# Patient Record
Sex: FEMALE | Race: WHITE | NOT HISPANIC OR LATINO | URBAN - METROPOLITAN AREA
[De-identification: names, ages, dates, MRNs, and addresses within clinical notes are randomized per-mention and may not be internally consistent; named-entity substitution may affect disease eponyms.]

---

## 2018-12-28 ENCOUNTER — INPATIENT (INPATIENT)
Facility: HOSPITAL | Age: 48
LOS: 5 days | Discharge: HOME | DRG: 392 | End: 2019-01-03
Attending: INTERNAL MEDICINE | Admitting: INTERNAL MEDICINE
Payer: MEDICARE

## 2018-12-28 VITALS
DIASTOLIC BLOOD PRESSURE: 77 MMHG | WEIGHT: 149.03 LBS | HEIGHT: 64 IN | RESPIRATION RATE: 16 BRPM | HEART RATE: 104 BPM | TEMPERATURE: 98 F | OXYGEN SATURATION: 100 % | SYSTOLIC BLOOD PRESSURE: 115 MMHG

## 2018-12-28 LAB
ALBUMIN SERPL ELPH-MCNC: 3 G/DL — LOW (ref 3.5–5)
ALP SERPL-CCNC: 124 U/L — HIGH (ref 40–120)
ALT FLD-CCNC: 28 U/L DA — SIGNIFICANT CHANGE UP (ref 10–60)
AMYLASE P1 CFR SERPL: 50 U/L — SIGNIFICANT CHANGE UP (ref 25–115)
ANION GAP SERPL CALC-SCNC: 7 MMOL/L — SIGNIFICANT CHANGE UP (ref 5–17)
APPEARANCE UR: CLEAR — SIGNIFICANT CHANGE UP
AST SERPL-CCNC: 20 U/L — SIGNIFICANT CHANGE UP (ref 10–40)
BACTERIA # UR AUTO: ABNORMAL /HPF
BASOPHILS # BLD AUTO: 0 K/UL — SIGNIFICANT CHANGE UP (ref 0–0.2)
BASOPHILS NFR BLD AUTO: 0.4 % — SIGNIFICANT CHANGE UP (ref 0–2)
BILIRUB SERPL-MCNC: 0.4 MG/DL — SIGNIFICANT CHANGE UP (ref 0.2–1.2)
BILIRUB UR-MCNC: NEGATIVE — SIGNIFICANT CHANGE UP
BUN SERPL-MCNC: 14 MG/DL — SIGNIFICANT CHANGE UP (ref 7–18)
CALCIUM SERPL-MCNC: 8.8 MG/DL — SIGNIFICANT CHANGE UP (ref 8.4–10.5)
CHLORIDE SERPL-SCNC: 101 MMOL/L — SIGNIFICANT CHANGE UP (ref 96–108)
CO2 SERPL-SCNC: 29 MMOL/L — SIGNIFICANT CHANGE UP (ref 22–31)
COLOR SPEC: YELLOW — SIGNIFICANT CHANGE UP
CREAT SERPL-MCNC: 0.55 MG/DL — SIGNIFICANT CHANGE UP (ref 0.5–1.3)
DIFF PNL FLD: ABNORMAL
EOSINOPHIL # BLD AUTO: 0.6 K/UL — HIGH (ref 0–0.5)
EOSINOPHIL NFR BLD AUTO: 7.3 % — HIGH (ref 0–6)
EPI CELLS # UR: ABNORMAL /HPF
GLUCOSE SERPL-MCNC: 98 MG/DL — SIGNIFICANT CHANGE UP (ref 70–99)
GLUCOSE UR QL: NEGATIVE — SIGNIFICANT CHANGE UP
HCG UR QL: NEGATIVE — SIGNIFICANT CHANGE UP
HCT VFR BLD CALC: 39.7 % — SIGNIFICANT CHANGE UP (ref 34.5–45)
HGB BLD-MCNC: 12.4 G/DL — SIGNIFICANT CHANGE UP (ref 11.5–15.5)
KETONES UR-MCNC: NEGATIVE — SIGNIFICANT CHANGE UP
LEUKOCYTE ESTERASE UR-ACNC: NEGATIVE — SIGNIFICANT CHANGE UP
LIDOCAIN IGE QN: 63 U/L — LOW (ref 73–393)
LYMPHOCYTES # BLD AUTO: 1.1 K/UL — SIGNIFICANT CHANGE UP (ref 1–3.3)
LYMPHOCYTES # BLD AUTO: 14.7 % — SIGNIFICANT CHANGE UP (ref 13–44)
MAGNESIUM SERPL-MCNC: 1.9 MG/DL — SIGNIFICANT CHANGE UP (ref 1.6–2.6)
MCHC RBC-ENTMCNC: 27 PG — SIGNIFICANT CHANGE UP (ref 27–34)
MCHC RBC-ENTMCNC: 31.4 GM/DL — LOW (ref 32–36)
MCV RBC AUTO: 86.3 FL — SIGNIFICANT CHANGE UP (ref 80–100)
MONOCYTES # BLD AUTO: 0.6 K/UL — SIGNIFICANT CHANGE UP (ref 0–0.9)
MONOCYTES NFR BLD AUTO: 7.5 % — SIGNIFICANT CHANGE UP (ref 2–14)
NEUTROPHILS # BLD AUTO: 5.3 K/UL — SIGNIFICANT CHANGE UP (ref 1.8–7.4)
NEUTROPHILS NFR BLD AUTO: 70.1 % — SIGNIFICANT CHANGE UP (ref 43–77)
NITRITE UR-MCNC: NEGATIVE — SIGNIFICANT CHANGE UP
PH UR: 6 — SIGNIFICANT CHANGE UP (ref 5–8)
PHOSPHATE SERPL-MCNC: 3.3 MG/DL — SIGNIFICANT CHANGE UP (ref 2.5–4.5)
PLATELET # BLD AUTO: 243 K/UL — SIGNIFICANT CHANGE UP (ref 150–400)
POTASSIUM SERPL-MCNC: 4.3 MMOL/L — SIGNIFICANT CHANGE UP (ref 3.5–5.3)
POTASSIUM SERPL-SCNC: 4.3 MMOL/L — SIGNIFICANT CHANGE UP (ref 3.5–5.3)
PROT SERPL-MCNC: 8.6 G/DL — HIGH (ref 6–8.3)
PROT UR-MCNC: NEGATIVE — SIGNIFICANT CHANGE UP
RBC # BLD: 4.6 M/UL — SIGNIFICANT CHANGE UP (ref 3.8–5.2)
RBC # FLD: 12.6 % — SIGNIFICANT CHANGE UP (ref 10.3–14.5)
RBC CASTS # UR COMP ASSIST: SIGNIFICANT CHANGE UP /HPF (ref 0–2)
SODIUM SERPL-SCNC: 137 MMOL/L — SIGNIFICANT CHANGE UP (ref 135–145)
SP GR SPEC: 1.02 — SIGNIFICANT CHANGE UP (ref 1.01–1.02)
TSH SERPL-MCNC: 1.76 UU/ML — SIGNIFICANT CHANGE UP (ref 0.34–4.82)
UROBILINOGEN FLD QL: NEGATIVE — SIGNIFICANT CHANGE UP
WBC # BLD: 7.6 K/UL — SIGNIFICANT CHANGE UP (ref 3.8–10.5)
WBC # FLD AUTO: 7.6 K/UL — SIGNIFICANT CHANGE UP (ref 3.8–10.5)
WBC UR QL: SIGNIFICANT CHANGE UP /HPF (ref 0–5)

## 2018-12-28 PROCEDURE — 99285 EMERGENCY DEPT VISIT HI MDM: CPT

## 2018-12-28 PROCEDURE — 74177 CT ABD & PELVIS W/CONTRAST: CPT | Mod: 26

## 2018-12-28 RX ORDER — ACETAMINOPHEN 500 MG
650 TABLET ORAL ONCE
Qty: 0 | Refills: 0 | Status: COMPLETED | OUTPATIENT
Start: 2018-12-28 | End: 2018-12-28

## 2018-12-28 RX ORDER — ONDANSETRON 8 MG/1
4 TABLET, FILM COATED ORAL ONCE
Qty: 0 | Refills: 0 | Status: COMPLETED | OUTPATIENT
Start: 2018-12-28 | End: 2018-12-28

## 2018-12-28 RX ORDER — SODIUM CHLORIDE 9 MG/ML
3 INJECTION INTRAMUSCULAR; INTRAVENOUS; SUBCUTANEOUS ONCE
Qty: 0 | Refills: 0 | Status: COMPLETED | OUTPATIENT
Start: 2018-12-28 | End: 2018-12-28

## 2018-12-28 RX ORDER — IOHEXOL 300 MG/ML
30 INJECTION, SOLUTION INTRAVENOUS ONCE
Qty: 0 | Refills: 0 | Status: COMPLETED | OUTPATIENT
Start: 2018-12-28 | End: 2018-12-28

## 2018-12-28 RX ORDER — SODIUM CHLORIDE 9 MG/ML
1000 INJECTION INTRAMUSCULAR; INTRAVENOUS; SUBCUTANEOUS ONCE
Qty: 0 | Refills: 0 | Status: COMPLETED | OUTPATIENT
Start: 2018-12-28 | End: 2018-12-28

## 2018-12-28 RX ADMIN — IOHEXOL 30 MILLILITER(S): 300 INJECTION, SOLUTION INTRAVENOUS at 20:12

## 2018-12-28 RX ADMIN — SODIUM CHLORIDE 3 MILLILITER(S): 9 INJECTION INTRAMUSCULAR; INTRAVENOUS; SUBCUTANEOUS at 17:50

## 2018-12-28 RX ADMIN — SODIUM CHLORIDE 1000 MILLILITER(S): 9 INJECTION INTRAMUSCULAR; INTRAVENOUS; SUBCUTANEOUS at 17:50

## 2018-12-28 RX ADMIN — Medication 650 MILLIGRAM(S): at 23:11

## 2018-12-28 NOTE — ED ADULT NURSE NOTE - OBJECTIVE STATEMENT
The patient presents with lower abd pain intermittently for a month due to sexual activities.  She appears nontoxic.  Lower abd tenderness palpated.

## 2018-12-28 NOTE — ED ADULT NURSE REASSESSMENT NOTE - NS ED NURSE REASSESS COMMENT FT1
Patient remains hemodynamically stable and in no acute distress. Endorsed by MORGAN Burnett. Patient was medicated for pain.

## 2018-12-28 NOTE — ED PROVIDER NOTE - OBJECTIVE STATEMENT
47 y/o F pt with PMHx of anxiety and asthma presents to ED with 1 month h/o crampy abd pain with vomiting (nonbloody, nonbilious). Pt saw her PMD and was referred to ED for CT. Pt relates similar symptoms 20 years ago when she was diagnosed with leukemia. Pt also reports h/o umbilical and inguinal hernia. Patient denies fever, chills, diarrhea, weight loss, or any other complaints.

## 2018-12-29 DIAGNOSIS — R19.00 INTRA-ABDOMINAL AND PELVIC SWELLING, MASS AND LUMP, UNSPECIFIED SITE: ICD-10-CM

## 2018-12-29 DIAGNOSIS — Z29.9 ENCOUNTER FOR PROPHYLACTIC MEASURES, UNSPECIFIED: ICD-10-CM

## 2018-12-29 DIAGNOSIS — F41.9 ANXIETY DISORDER, UNSPECIFIED: ICD-10-CM

## 2018-12-29 LAB
ALBUMIN SERPL ELPH-MCNC: 2.8 G/DL — LOW (ref 3.5–5)
ALP SERPL-CCNC: 103 U/L — SIGNIFICANT CHANGE UP (ref 40–120)
ALT FLD-CCNC: 24 U/L DA — SIGNIFICANT CHANGE UP (ref 10–60)
ANION GAP SERPL CALC-SCNC: 6 MMOL/L — SIGNIFICANT CHANGE UP (ref 5–17)
AST SERPL-CCNC: 17 U/L — SIGNIFICANT CHANGE UP (ref 10–40)
BASOPHILS # BLD AUTO: 0 K/UL — SIGNIFICANT CHANGE UP (ref 0–0.2)
BASOPHILS NFR BLD AUTO: 0.3 % — SIGNIFICANT CHANGE UP (ref 0–2)
BILIRUB SERPL-MCNC: 0.4 MG/DL — SIGNIFICANT CHANGE UP (ref 0.2–1.2)
BUN SERPL-MCNC: 15 MG/DL — SIGNIFICANT CHANGE UP (ref 7–18)
CALCIUM SERPL-MCNC: 8.8 MG/DL — SIGNIFICANT CHANGE UP (ref 8.4–10.5)
CANCER AG19-9 SERPL-ACNC: 35.8 U/ML — SIGNIFICANT CHANGE UP
CEA SERPL-MCNC: 315.6 NG/ML — HIGH (ref 0–3.8)
CHLORIDE SERPL-SCNC: 103 MMOL/L — SIGNIFICANT CHANGE UP (ref 96–108)
CHOLEST SERPL-MCNC: 180 MG/DL — SIGNIFICANT CHANGE UP (ref 10–199)
CO2 SERPL-SCNC: 30 MMOL/L — SIGNIFICANT CHANGE UP (ref 22–31)
CREAT SERPL-MCNC: 0.56 MG/DL — SIGNIFICANT CHANGE UP (ref 0.5–1.3)
EOSINOPHIL # BLD AUTO: 0.7 K/UL — HIGH (ref 0–0.5)
EOSINOPHIL NFR BLD AUTO: 9.5 % — HIGH (ref 0–6)
FOLATE SERPL-MCNC: 12.4 NG/ML — SIGNIFICANT CHANGE UP
GLUCOSE SERPL-MCNC: 103 MG/DL — HIGH (ref 70–99)
HBA1C BLD-MCNC: 5.2 % — SIGNIFICANT CHANGE UP (ref 4–5.6)
HCT VFR BLD CALC: 35.3 % — SIGNIFICANT CHANGE UP (ref 34.5–45)
HDLC SERPL-MCNC: 40 MG/DL — LOW
HGB BLD-MCNC: 11.1 G/DL — LOW (ref 11.5–15.5)
LIPID PNL WITH DIRECT LDL SERPL: 126 MG/DL — SIGNIFICANT CHANGE UP
LYMPHOCYTES # BLD AUTO: 1 K/UL — SIGNIFICANT CHANGE UP (ref 1–3.3)
LYMPHOCYTES # BLD AUTO: 14.1 % — SIGNIFICANT CHANGE UP (ref 13–44)
MAGNESIUM SERPL-MCNC: 2 MG/DL — SIGNIFICANT CHANGE UP (ref 1.6–2.6)
MCHC RBC-ENTMCNC: 27.4 PG — SIGNIFICANT CHANGE UP (ref 27–34)
MCHC RBC-ENTMCNC: 31.4 GM/DL — LOW (ref 32–36)
MCV RBC AUTO: 87.3 FL — SIGNIFICANT CHANGE UP (ref 80–100)
MONOCYTES # BLD AUTO: 0.6 K/UL — SIGNIFICANT CHANGE UP (ref 0–0.9)
MONOCYTES NFR BLD AUTO: 7.8 % — SIGNIFICANT CHANGE UP (ref 2–14)
NEUTROPHILS # BLD AUTO: 4.9 K/UL — SIGNIFICANT CHANGE UP (ref 1.8–7.4)
NEUTROPHILS NFR BLD AUTO: 68.2 % — SIGNIFICANT CHANGE UP (ref 43–77)
PHOSPHATE SERPL-MCNC: 4 MG/DL — SIGNIFICANT CHANGE UP (ref 2.5–4.5)
PLATELET # BLD AUTO: 193 K/UL — SIGNIFICANT CHANGE UP (ref 150–400)
POTASSIUM SERPL-MCNC: 4.1 MMOL/L — SIGNIFICANT CHANGE UP (ref 3.5–5.3)
POTASSIUM SERPL-SCNC: 4.1 MMOL/L — SIGNIFICANT CHANGE UP (ref 3.5–5.3)
PROT SERPL-MCNC: 7.6 G/DL — SIGNIFICANT CHANGE UP (ref 6–8.3)
RBC # BLD: 4.04 M/UL — SIGNIFICANT CHANGE UP (ref 3.8–5.2)
RBC # FLD: 13 % — SIGNIFICANT CHANGE UP (ref 10.3–14.5)
SODIUM SERPL-SCNC: 139 MMOL/L — SIGNIFICANT CHANGE UP (ref 135–145)
TOTAL CHOLESTEROL/HDL RATIO MEASUREMENT: 4.5 RATIO — SIGNIFICANT CHANGE UP (ref 3.3–7.1)
TRIGL SERPL-MCNC: 72 MG/DL — SIGNIFICANT CHANGE UP (ref 10–149)
TSH SERPL-MCNC: 2.84 UU/ML — SIGNIFICANT CHANGE UP (ref 0.34–4.82)
VIT B12 SERPL-MCNC: >2000 PG/ML — HIGH (ref 232–1245)
WBC # BLD: 7.1 K/UL — SIGNIFICANT CHANGE UP (ref 3.8–10.5)
WBC # FLD AUTO: 7.1 K/UL — SIGNIFICANT CHANGE UP (ref 3.8–10.5)

## 2018-12-29 PROCEDURE — 71045 X-RAY EXAM CHEST 1 VIEW: CPT | Mod: 26

## 2018-12-29 RX ORDER — ACETAMINOPHEN 500 MG
650 TABLET ORAL ONCE
Qty: 0 | Refills: 0 | Status: COMPLETED | OUTPATIENT
Start: 2018-12-29 | End: 2018-12-29

## 2018-12-29 RX ORDER — KETOROLAC TROMETHAMINE 30 MG/ML
15 SYRINGE (ML) INJECTION EVERY 6 HOURS
Qty: 0 | Refills: 0 | Status: DISCONTINUED | OUTPATIENT
Start: 2018-12-29 | End: 2018-12-29

## 2018-12-29 RX ORDER — TRAMADOL HYDROCHLORIDE 50 MG/1
25 TABLET ORAL
Qty: 0 | Refills: 0 | Status: DISCONTINUED | OUTPATIENT
Start: 2018-12-29 | End: 2018-12-30

## 2018-12-29 RX ORDER — ONDANSETRON 8 MG/1
4 TABLET, FILM COATED ORAL EVERY 4 HOURS
Qty: 0 | Refills: 0 | Status: DISCONTINUED | OUTPATIENT
Start: 2018-12-29 | End: 2019-01-03

## 2018-12-29 RX ORDER — INFLUENZA VIRUS VACCINE 15; 15; 15; 15 UG/.5ML; UG/.5ML; UG/.5ML; UG/.5ML
0.5 SUSPENSION INTRAMUSCULAR ONCE
Qty: 0 | Refills: 0 | Status: DISCONTINUED | OUTPATIENT
Start: 2018-12-29 | End: 2019-01-03

## 2018-12-29 RX ORDER — ACETAMINOPHEN 500 MG
650 TABLET ORAL EVERY 6 HOURS
Qty: 0 | Refills: 0 | Status: DISCONTINUED | OUTPATIENT
Start: 2018-12-29 | End: 2019-01-03

## 2018-12-29 RX ORDER — HEPARIN SODIUM 5000 [USP'U]/ML
5000 INJECTION INTRAVENOUS; SUBCUTANEOUS EVERY 12 HOURS
Qty: 0 | Refills: 0 | Status: DISCONTINUED | OUTPATIENT
Start: 2018-12-29 | End: 2019-01-03

## 2018-12-29 RX ORDER — IBUPROFEN 200 MG
400 TABLET ORAL ONCE
Qty: 0 | Refills: 0 | Status: COMPLETED | OUTPATIENT
Start: 2018-12-29 | End: 2018-12-29

## 2018-12-29 RX ADMIN — Medication 650 MILLIGRAM(S): at 00:26

## 2018-12-29 RX ADMIN — Medication 1 MILLIGRAM(S): at 20:46

## 2018-12-29 RX ADMIN — Medication 400 MILLIGRAM(S): at 06:00

## 2018-12-29 RX ADMIN — Medication 400 MILLIGRAM(S): at 05:18

## 2018-12-29 RX ADMIN — Medication 650 MILLIGRAM(S): at 21:11

## 2018-12-29 RX ADMIN — Medication 650 MILLIGRAM(S): at 22:10

## 2018-12-29 NOTE — H&P ADULT - ASSESSMENT
48F, from home, lives alone, w/ PMHx anxiety disorder, 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 Patient mentions pain went away for a month and came back after having sexual intercourse. Patient attributes symptoms to sexual activity, and mentions she's symptom free otherwise. Has visited [facility] ER multiple times for the past 3 months, fo rthe same complaint and has was told she had abdominal spasms. Mentions being unable to move out of bed due to symptoms. Denies night sweats, chills, fevers, weight loss, fatigue, diarrhea or any other complaint. Taking Tylenol and Aleve at home with minimal improvement of symptoms. Patient completed 3-4 yrs of chemo, and afterwards has not followed with heme/onc; mentions only following up with PCP. Patient reports a history of umbilical hernia which was repaired several years ago. 48F, from home, lives alone, w/ PMHx anxiety disorder, 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 Patient mentions pain went away for a month and came back after having sexual intercourse. Patient attributes symptoms to sexual activity, and mentions she's symptom free otherwise. Has visited [facility] ER multiple times for the past 3 months, for the same complaint and has was told she had abdominal spasms. Mentions being unable to move out of bed due to symptoms. Denies night sweats, chills, fevers, weight loss, fatigue, diarrhea or any other complaint. Refers taking Tylenol and Aleve at home with minimal improvement of symptoms. Patient completed 3-4 yrs of chemo, and afterwards has not followed with heme/onc; mentions only following up with PCP. Patient reports a history of umbilical hernia which was repaired several years ago. Patient denies having any colonoscopy or mammography. Reports having a Pap smear 9 mos ago which was normal, and breast examination as well.     *Patient seems to have some degree of cognitive impairment. Primary team to contact family for collateral information*

## 2018-12-29 NOTE — CONSULT NOTE ADULT - ASSESSMENT
48 hear ols lady presented with increasing pain at lower abdomen for a few months. The pain gets worse after eating.  CT showed a retroperitoneal mass with lymph nodes.  CEA was 315.

## 2018-12-29 NOTE — CONSULT NOTE ADULT - SUBJECTIVE AND OBJECTIVE BOX
Patient is a 48y old  Female who presents with a chief complaint of abdominal pain (29 Dec 2018 03:08)      HPI:  48F, from home, lives alone, w/ PMHx anxiety disorder, 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 Patient mentions pain went away for a month and came back after having sexual intercourse. Patient attributes symptoms to sexual activity, and mentions she's symptom free otherwise. Has visited [facility] ER multiple times for the past 3 months, for the same complaint and has was told she had abdominal spasms. Mentions being unable to move out of bed due to symptoms. Denies night sweats, chills, fevers, weight loss, fatigue, diarrhea or any other complaint. Refers taking Tylenol and Aleve at home with minimal improvement of symptoms. Patient completed 3-4 yrs of chemo, and afterwards has not followed with heme/onc; mentions only following up with PCP. Patient reports a history of umbilical hernia which was repaired several years ago. Patient denies having any colonoscopy or mammography. Reports having a Pap smear 9 mos ago which was normal, and breast examination as well. (29 Dec 2018 03:08)  She had RT to brain, not to spine.  She had 2 hernia repair and liposuction.  She also had a benign tumor removed from right breast 10 years ago in this hospital.. Needs to find path.  the lower abd pain increases with eating.  She never had colonoscopy or colonoscopy.	       ROS:  Negative except for:    PAST MEDICAL & SURGICAL HISTORY:  Meniscus tear  Anxiety  Asthma  Leukemia: age 23 had chemo and radiation  Port catheter in place  S/P breast biopsy, right  S/P abdominoplasty  S/P myomectomy      SOCIAL HISTORY:    FAMILY HISTORY:  Family history of cancer (Father, Mother)      MEDICATIONS  (STANDING):  acetaminophen   Tablet .. 650 milliGRAM(s) Oral once  heparin  Injectable 5000 Unit(s) SubCutaneous every 12 hours  influenza   Vaccine 0.5 milliLiter(s) IntraMuscular once  traMADol 25 milliGRAM(s) Oral two times a day    MEDICATIONS  (PRN):  ketorolac   Injectable 15 milliGRAM(s) IV Push every 6 hours PRN Moderate Pain (4 - 6)  LORazepam     Tablet 1 milliGRAM(s) Oral daily PRN Anxiety  ondansetron Injectable 4 milliGRAM(s) IV Push every 4 hours PRN Nausea and/or Vomiting      Allergies    No Known Allergies    Intolerances        Vital Signs Last 24 Hrs  T(C): 36.5 (29 Dec 2018 14:11), Max: 37.2 (28 Dec 2018 23:32)  T(F): 97.7 (29 Dec 2018 14:11), Max: 98.9 (28 Dec 2018 23:32)  HR: 80 (29 Dec 2018 14:11) (80 - 110)  BP: 99/62 (29 Dec 2018 14:11) (99/62 - 119/75)  BP(mean): --  RR: 18 (29 Dec 2018 14:11) (18 - 19)  SpO2: 99% (29 Dec 2018 14:11) (98% - 100%)    PHYSICAL EXAM  General: adult in NAD  HEENT: clear oropharynx, anicteric sclera, pink conjunctiva  Neck: supple  CV: normal S1/S2 with no murmur rubs or gallops  Lungs: positive air movement b/l ant lungs,clear to auscultation, no wheezes, no rales  Abdomen: soft non-tender non-distended, no hepatosplenomegaly  Ext: no clubbing cyanosis or edema  Skin: no rashes and no petechiae  Neuro: alert and oriented X 4, no focal deficits      LABS:                          11.1   7.1   )-----------( 193      ( 29 Dec 2018 06:08 )             35.3         Mean Cell Volume : 87.3 fl  Mean Cell Hemoglobin : 27.4 pg  Mean Cell Hemoglobin Concentration : 31.4 gm/dL  Auto Neutrophil # : 4.9 K/uL  Auto Lymphocyte # : 1.0 K/uL  Auto Monocyte # : 0.6 K/uL  Auto Eosinophil # : 0.7 K/uL  Auto Basophil # : 0.0 K/uL  Auto Neutrophil % : 68.2 %  Auto Lymphocyte % : 14.1 %  Auto Monocyte % : 7.8 %  Auto Eosinophil % : 9.5 %  Auto Basophil % : 0.3 %      Serial CBC's  12-29 @ 06:08  Hct-35.3 / Hgb-11.1 / Plat-193 / RBC-4.04 / WBC-7.1  Serial CBC's  12-28 @ 17:31  Hct-39.7 / Hgb-12.4 / Plat-243 / RBC-4.60 / WBC-7.6      12-29    139  |  103  |  15  ----------------------------<  103<H>  4.1   |  30  |  0.56    Ca    8.8      29 Dec 2018 06:08  Phos  4.0     12-29  Mg     2.0     12-29    TPro  7.6  /  Alb  2.8<L>  /  TBili  0.4  /  DBili  x   /  AST  17  /  ALT  24  /  AlkPhos  103  12-29          Folate, Serum: 12.4 ng/mL (12-29 @ 09:09)  Vitamin B12, Serum: >2000 pg/mL (12-29 @ 09:09)              BLOOD SMEAR INTERPRETATION:       RADIOLOGY & ADDITIONAL STUDIES: Patient is a 48y old  Female who presents with a chief complaint of abdominal pain (29 Dec 2018 03:08)      HPI:  48F, from home, lives alone, w/ PMHx anxiety disorder, 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 Patient mentions pain went away for a month and came back after having sexual intercourse. Patient attributes symptoms to sexual activity, and mentions she's symptom free otherwise. Has visited [facility] ER multiple times for the past 3 months, for the same complaint and has was told she had abdominal spasms. Mentions being unable to move out of bed due to symptoms. Denies night sweats, chills, fevers, weight loss, fatigue, diarrhea or any other complaint. Refers taking Tylenol and Aleve at home with minimal improvement of symptoms. Patient completed 3-4 yrs of chemo, and afterwards has not followed with heme/onc; mentions only following up with PCP. Patient reports a history of umbilical hernia which was repaired several years ago. Patient denies having any colonoscopy or mammography. Reports having a Pap smear 9 mos ago which was normal, and breast examination as well. (29 Dec 2018 03:08)  She had RT to brain, not to spine.  She had 2 hernia repair and liposuction.  She also had a benign tumor removed from right breast 10 years ago in this hospital.. Needs to find path.  the lower abd pain increases with eating.  She never had colonoscopy or colonoscopy.	       ROS:  Negative except for:    PAST MEDICAL & SURGICAL HISTORY:  Meniscus tear  Anxiety  Asthma  Leukemia: age 23 had chemo and radiation  Port catheter in place  S/P breast biopsy, right  S/P abdominoplasty  S/P myomectomy      SOCIAL HISTORY:    FAMILY HISTORY:  Family history of cancer (Father, Mother)      MEDICATIONS  (STANDING):  acetaminophen   Tablet .. 650 milliGRAM(s) Oral once  heparin  Injectable 5000 Unit(s) SubCutaneous every 12 hours  influenza   Vaccine 0.5 milliLiter(s) IntraMuscular once  traMADol 25 milliGRAM(s) Oral two times a day    MEDICATIONS  (PRN):  ketorolac   Injectable 15 milliGRAM(s) IV Push every 6 hours PRN Moderate Pain (4 - 6)  LORazepam     Tablet 1 milliGRAM(s) Oral daily PRN Anxiety  ondansetron Injectable 4 milliGRAM(s) IV Push every 4 hours PRN Nausea and/or Vomiting      Allergies    No Known Allergies    Intolerances        Vital Signs Last 24 Hrs  T(C): 36.5 (29 Dec 2018 14:11), Max: 37.2 (28 Dec 2018 23:32)  T(F): 97.7 (29 Dec 2018 14:11), Max: 98.9 (28 Dec 2018 23:32)  HR: 80 (29 Dec 2018 14:11) (80 - 110)  BP: 99/62 (29 Dec 2018 14:11) (99/62 - 119/75)  BP(mean): --  RR: 18 (29 Dec 2018 14:11) (18 - 19)  SpO2: 99% (29 Dec 2018 14:11) (98% - 100%)    PHYSICAL EXAM  General: adult in NAD  HEENT: clear oropharynx, anicteric sclera, pink conjunctiva  Neck: supple  CV: normal S1/S2 with no murmur rubs or gallops  Lungs: positive air movement b/l ant lungs,clear to auscultation, no wheezes, no rales  Abdomen: soft non-tender non-distended, no hepatosplenomegaly  Ext: no clubbing cyanosis or edema  Skin: no rashes and no petechiae  Neuro: alert and oriented X 4, no focal deficits      LABS:                          11.1   7.1   )-----------( 193      ( 29 Dec 2018 06:08 )             35.3         Mean Cell Volume : 87.3 fl  Mean Cell Hemoglobin : 27.4 pg  Mean Cell Hemoglobin Concentration : 31.4 gm/dL  Auto Neutrophil # : 4.9 K/uL  Auto Lymphocyte # : 1.0 K/uL  Auto Monocyte # : 0.6 K/uL  Auto Eosinophil # : 0.7 K/uL  Auto Basophil # : 0.0 K/uL  Auto Neutrophil % : 68.2 %  Auto Lymphocyte % : 14.1 %  Auto Monocyte % : 7.8 %  Auto Eosinophil % : 9.5 %  Auto Basophil % : 0.3 %      Serial CBC's  12-29 @ 06:08  Hct-35.3 / Hgb-11.1 / Plat-193 / RBC-4.04 / WBC-7.1  Serial CBC's  12-28 @ 17:31  Hct-39.7 / Hgb-12.4 / Plat-243 / RBC-4.60 / WBC-7.6      12-29    139  |  103  |  15  ----------------------------<  103<H>  4.1   |  30  |  0.56    Ca    8.8      29 Dec 2018 06:08  Phos  4.0     12-29  Mg     2.0     12-29    TPro  7.6  /  Alb  2.8<L>  /  TBili  0.4  /  DBili  x   /  AST  17  /  ALT  24  /  AlkPhos  103  12-29          Folate, Serum: 12.4 ng/mL (12-29 @ 09:09)  Vitamin B12, Serum: >2000 pg/mL (12-29 @ 09:09)              BLOOD SMEAR INTERPRETATION:       RADIOLOGY & ADDITIONAL STUDIES:< from: CT Abdomen and Pelvis w/ Oral Cont and w/ IV Cont (12.28.18 @ 21:44) >  INTERPRETATION:  CLINICAL INFORMATION: Crampy abdominal pain, nausea and   vomiting for one month    COMPARISON: None.    PROCEDURE:   Contiguous axial scan of the abdomen and pelvis with intravenous and oral   contrast, followed by coronal and sagittal reformation.   85 mL of   Omnipaque were administered without adverse reaction. 15 mL of contrast   were discarded.    FINDINGS:    LOWER CHEST: Within normal limits.    LIVER: Within normal limits.  BILE DUCTS: Normal caliber.  GALLBLADDER: Within normal limits.  SPLEEN: Within normal limits.  PANCREAS: Within normal limits.  ADRENALS: Within normal limits.  KIDNEYS/URETERS: Within normal limits.    BLADDER: Within normal limits.  REPRODUCTIVE ORGANS: Unremarkable.         BOWEL: No bowel obstruction. Appendix is not visualized.  PERITONEUM: No ascites.  VESSELS:  Within normal limits.  RETROPERITONEUM: There is 3 x 3.7 x 4.9 cm sized softtissue mass density   in the lower mid abdomen with numerous variable sized lymph nodes in the   periaortic, aortocaval and pelvic region (602-32, [phone]). There is   extensive fat stranding around the soft tissue mass and lymph nodes..     ABDOMINAL WALL: Surgical sutures in the right lower abdominal wall..   Small fat-containing ventral hernia.  BONES: No osteolytic foci suggesting metastasis..    < end of copied text >  < from: CT Abdomen and Pelvis w/ Oral Cont and w/ IV Cont (12.28.18 @ 21:44) >  IMPRESSION: 3 x 3.7 x 4.9 cm sized low abdominal soft tissue mass with   numerous retroperitoneal lymph nodes as described. Leading differential   is lymphoma or metastasis.    Findings were discussed with Dr. Hu with read back at 10:37 PM.    < end of copied text >

## 2018-12-29 NOTE — CONSULT NOTE ADULT - PROBLEM SELECTOR RECOMMENDATION 9
with retroperitoneal nodes and high CEA.  the cancer most likely GI origin, although she had a ?benign tumor at right breast 10 years ago.  Needs to find path.  Surgery was done here.  IR to biopsy this mass.  she needs EGD and colonoscopy. ?small bowel follow through.

## 2018-12-29 NOTE — H&P ADULT - HISTORY OF PRESENT ILLNESS
48F, from home, lives alone, w/ PMHx anxiety disorder, 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 Patient mentions pain went away for a month and came back after having sexual intercourse. Patient attributes symptoms to sexual activity, and mentions she's symptom free otherwise. Has visited [facility] ER multiple times for the past 3 months, fo rthe same complaint and has was told she had abdominal spasms. Mentions being unable to move out of bed due to symptoms. Denies night sweats, chills, fevers, weight loss, fatigue, diarrhea or any other complaint. Taking Tylenol and Aleve at home with minimal improvement of symptoms. Patient completed 3-4 yrs of chemo, and afterwards has not followed with heme/onc; mentions only following up with PCP. Patient reports a history of umbilical hernia which was repaired several years ago. 48F, from home, lives alone, w/ PMHx anxiety disorder, 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 Patient mentions pain went away for a month and came back after having sexual intercourse. Patient attributes symptoms to sexual activity, and mentions she's symptom free otherwise. Has visited [facility] ER multiple times for the past 3 months, for the same complaint and has was told she had abdominal spasms. Mentions being unable to move out of bed due to symptoms. Denies night sweats, chills, fevers, weight loss, fatigue, diarrhea or any other complaint. Refers taking Tylenol and Aleve at home with minimal improvement of symptoms. Patient completed 3-4 yrs of chemo, and afterwards has not followed with heme/onc; mentions only following up with PCP. Patient reports a history of umbilical hernia which was repaired several years ago. Patient denies having any colonoscopy or mammography. Reports having a Pap smear 9 mos ago which was normal, and breast examination as well.

## 2018-12-29 NOTE — H&P ADULT - PROBLEM SELECTOR PLAN 3
IMPROVE VTE Individual Risk Assessment          RISK                                                          Points  [  ] Previous VTE                                                3  [  ] Thrombophilia                                             2  [  ] Lower limb paralysis                                   2        (unable to hold up >15 seconds)    [  ] Current Cancer                                             2         (within 6 months)  [ x ] Immobilization > 24 hrs                              1  [  ] ICU/CCU stay > 24 hours                             1  [  ] Age > 60                                                         1    IMPROVE VTE Score: 2  HSQ for DVT chemoppx

## 2018-12-29 NOTE — H&P ADULT - PROBLEM SELECTOR PLAN 1
Patient p/w crampy, intermittent abdominal pain in addition to N/V x 3 months  CT A/P: < from: 3 x 3.7 x 4.9 cm sized low abdominal soft tissue mass with numerous retroperitoneal lymph nodes as described. Leading differential is lymphoma or metastasis.  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  Follow up CT chest  Will consult heme/onc  c/w supportive care

## 2018-12-30 LAB
24R-OH-CALCIDIOL SERPL-MCNC: 28.9 NG/ML — LOW (ref 30–80)
BCA 255 TISS QL IMSTN: 2138.6 U/ML — HIGH
CANCER AG125 SERPL-ACNC: 6352 U/ML — HIGH

## 2018-12-30 PROCEDURE — 99223 1ST HOSP IP/OBS HIGH 75: CPT

## 2018-12-30 RX ORDER — IBUPROFEN 200 MG
400 TABLET ORAL ONCE
Qty: 0 | Refills: 0 | Status: COMPLETED | OUTPATIENT
Start: 2018-12-30 | End: 2018-12-30

## 2018-12-30 RX ORDER — LANOLIN ALCOHOL/MO/W.PET/CERES
3 CREAM (GRAM) TOPICAL AT BEDTIME
Qty: 0 | Refills: 0 | Status: DISCONTINUED | OUTPATIENT
Start: 2018-12-30 | End: 2018-12-30

## 2018-12-30 RX ORDER — LANOLIN ALCOHOL/MO/W.PET/CERES
3 CREAM (GRAM) TOPICAL AT BEDTIME
Qty: 0 | Refills: 0 | Status: DISCONTINUED | OUTPATIENT
Start: 2018-12-30 | End: 2019-01-03

## 2018-12-30 RX ADMIN — Medication 3 MILLIGRAM(S): at 03:14

## 2018-12-30 RX ADMIN — Medication 400 MILLIGRAM(S): at 02:53

## 2018-12-30 RX ADMIN — Medication 400 MILLIGRAM(S): at 01:57

## 2018-12-30 RX ADMIN — Medication 650 MILLIGRAM(S): at 13:23

## 2018-12-30 RX ADMIN — Medication 400 MILLIGRAM(S): at 21:10

## 2018-12-30 RX ADMIN — Medication 3 MILLIGRAM(S): at 21:51

## 2018-12-30 RX ADMIN — Medication 650 MILLIGRAM(S): at 14:18

## 2018-12-30 RX ADMIN — Medication 400 MILLIGRAM(S): at 20:10

## 2018-12-30 RX ADMIN — Medication 1 MILLIGRAM(S): at 09:58

## 2018-12-30 NOTE — CONSULT NOTE ADULT - SUBJECTIVE AND OBJECTIVE BOX
Patient is a 48y old  Female who presents with a chief complaint of abdominal pain (30 Dec 2018 09:50)    HPI: 48y Female  presents with a chief complaint of         . The patient has a significant past medical history of           .     REVIEW OF SYSTEMS  Constitutional:   No fever, no fatigue, no pallor, no night sweats, no weight loss.  HEENT:   No eye pain, no vision changes, no icterus, no mouth ulcers.  Respiratory:   No shortness of breath, no cough, no respiratory distress.   Cardiovascular:   No chest pain, no palpitations.   Gastrointestinal: No abdominal pain, no nausea, no vomiting , no diahrrea, no constipation, no hematochezia,no melena.  Skin:   No rashes, no jaundice, no eczema.   Musculoskeletal:   No joint pain, no swelling, no myalgia.   Neurologic:   No headache, no seizure, no weakness.   Genitourinary:   No dysuria, no decreased urine output.  Psychiatric:  No depression, no anxiety,   Endocrine:   No thyroid disease, no diabetes.  Heme/Lymphatic:   No anemia, no blood transfusions, no lymph node enlargement, no bleeding, no bruising.  ___________________________________________________________________________________________  Allergies    No Known Allergies    Intolerances      MEDICATIONS  (STANDING):  heparin  Injectable 5000 Unit(s) SubCutaneous every 12 hours  influenza   Vaccine 0.5 milliLiter(s) IntraMuscular once  melatonin 3 milliGRAM(s) Oral at bedtime  traMADol 25 milliGRAM(s) Oral two times a day    MEDICATIONS  (PRN):  acetaminophen   Tablet .. 650 milliGRAM(s) Oral every 6 hours PRN Moderate Pain (4 - 6)  ketorolac   Injectable 15 milliGRAM(s) IV Push every 6 hours PRN Moderate Pain (4 - 6)  LORazepam     Tablet 1 milliGRAM(s) Oral daily PRN Anxiety  ondansetron Injectable 4 milliGRAM(s) IV Push every 4 hours PRN Nausea and/or Vomiting      PAST MEDICAL & SURGICAL HISTORY:  Meniscus tear  Anxiety  Asthma  Leukemia: age 23 had chemo and radiation  Port catheter in place  S/P breast biopsy, right  S/P abdominoplasty  S/P myomectomy    FAMILY HISTORY:  Family history of cancer (Father, Mother)    Social History: No hsitory of : Tobacco use, IVDA, EToH  ______________________________________________________________________________________    PHYSICAL EXAM    Daily     Daily Weight in k.3 (30 Dec 2018 05:27)  BMI: 25.6 (12-28 @ 16:57)  Change in Weight:  Vital Signs Last 24 Hrs  T(C): 36.4 (30 Dec 2018 05:27), Max: 36.8 (29 Dec 2018 21:05)  T(F): 97.5 (30 Dec 2018 05:27), Max: 98.2 (29 Dec 2018 21:05)  HR: 78 (30 Dec 2018 05:27) (78 - 93)  BP: 95/46 (30 Dec 2018 05:27) (95/46 - 99/62)  BP(mean): --  RR: 18 (30 Dec 2018 05:27) (16 - 18)  SpO2: 100% (30 Dec 2018 05:27) (99% - 100%)    General:  Well developed, well nourished, alert and active, no pallor, NAD.  HEENT:    Normal appearance of conjunctiva, ears, nose, lips, oropharynx, and oral mucosa, anicteric.  Neck:  No masses, no asymmetry.  Lymph Nodes:  No lymphadenopathy.   Cardiovascular:  RRR normal S1/S2, no murmur.  Respiratory:  CTA B/L, normal respiratory effort.   Abdominal:   soft, no masses or tenderness, normoactive BS, NT/ND, no HSM.  Extremities:   No clubbing or cyanosis, normal capillary refill, no edema.   Skin:   No rash, jaundice, lesions, eczema.   Musculoskeletal:  No joint swelling, erythema or tenderness.   Neuro: No focal deficits.   Other:   _______________________________________________________________________________________________  Lab Results:                          11.1   7.1   )-----------( 193      ( 29 Dec 2018 06:08 )             35.3         139  |  103  |  15  ----------------------------<  103<H>  4.1   |  30  |  0.56    Ca    8.8      29 Dec 2018 06:08  Phos  4.0       Mg     2.0         TPro  7.6  /  Alb  2.8<L>  /  TBili  0.4  /  DBili  x   /  AST  17  /  ALT  24  /  AlkPhos  103      LIVER FUNCTIONS - ( 29 Dec 2018 06:08 )  Alb: 2.8 g/dL / Pro: 7.6 g/dL / ALK PHOS: 103 U/L / ALT: 24 U/L DA / AST: 17 U/L / GGT: x                   Stool Results:          RADIOLOGY RESULTS:    SURGICAL PATHOLOGY:

## 2018-12-30 NOTE — PROGRESS NOTE ADULT - SUBJECTIVE AND OBJECTIVE BOX
Patient is a 48y old  Female who presents with a chief complaint of abdominal pain (29 Dec 2018 18:09)    pt seen in icu [  ], reg med floor [  x ], bed [ x ], chair at bedside [   ], a+o x3 [ x ], lethargic [  ],  nad [x  ]        Allergies    No Known Allergies        Vitals    T(F): 97.5 (12-30-18 @ 05:27), Max: 98.2 (12-29-18 @ 21:05)  HR: 78 (12-30-18 @ 05:27) (78 - 93)  BP: 95/46 (12-30-18 @ 05:27) (95/46 - 99/62)  RR: 18 (12-30-18 @ 05:27) (16 - 18)  SpO2: 100% (12-30-18 @ 05:27) (99% - 100%)  Wt(kg): --  CAPILLARY BLOOD GLUCOSE          Labs                          11.1   7.1   )-----------( 193      ( 29 Dec 2018 06:08 )             35.3       12-29    139  |  103  |  15  ----------------------------<  103<H>  4.1   |  30  |  0.56    Ca    8.8      29 Dec 2018 06:08  Phos  4.0     12-29  Mg     2.0     12-29    TPro  7.6  /  Alb  2.8<L>  /  TBili  0.4  /  DBili  x   /  AST  17  /  ALT  24  /  AlkPhos  103  12-29    Cancer Antigen, GI Ca 19-9: 35.8: METHOD: [employer] Chemiluminescent Immunoassay  Values obtained with different assay methods or kits cannot be used  interchangeably.  Results cannot be interpreted as absolute evidence of the presence or  absence of malignant disease. U/mL (12.29.18 @ 09:08)    Cancer Antigen, 125: 6352: Test repeated.  Method: Abbott CMIA  Values obtained with different assay methods or kits cannot be used  interchangeably. Results cannot be interpreted as absolute evidence of  the presence or absence of malignant disease. U/mL (12.29.18 @ 23:42)    Cancer Antigen, Breast Ca 15.3: 2138.6: Test repeated.  Method: Abbott Chemiluminescent Microparticle Immunoassay.  Values obtained with different assay methods or kits cannot be used  interchangeably.  Results cannot be interpreted as absolute evidence of the presence or  absence of malignant disease. U/mL (12.29.18 @ 22:08)    Carcinoembryonic Antigen: 315.6: METHOD: Roche EIA   The CEA assay should not be used as a cancer screening test. Serum CEA  concentrations should only be used in conjunction with   information available from the clinical evaluation of the patien and  from other diagnostic procedures.   CEA Normal Ranges   _________________   Non-smoker: less than 3.9 ng/mL       Smoker: less than 5.5 ng/mL ng/mL (12.29.18 @ 09:09)          Radiology Results    < from: CT Abdomen and Pelvis w/ Oral Cont and w/ IV Cont (12.28.18 @ 21:44) >    IMPRESSION: 3 x 3.7 x 4.9 cm sized low abdominal soft tissue mass with   numerous retroperitoneal lymph nodes as described. Leading differential   is lymphoma or metastasis.      < end of copied text >        Meds    MEDICATIONS  (STANDING):  heparin  Injectable 5000 Unit(s) SubCutaneous every 12 hours  influenza   Vaccine 0.5 milliLiter(s) IntraMuscular once  melatonin 3 milliGRAM(s) Oral at bedtime  traMADol 25 milliGRAM(s) Oral two times a day      MEDICATIONS  (PRN):  acetaminophen   Tablet .. 650 milliGRAM(s) Oral every 6 hours PRN Moderate Pain (4 - 6)  ketorolac   Injectable 15 milliGRAM(s) IV Push every 6 hours PRN Moderate Pain (4 - 6)  LORazepam     Tablet 1 milliGRAM(s) Oral daily PRN Anxiety  ondansetron Injectable 4 milliGRAM(s) IV Push every 4 hours PRN Nausea and/or Vomiting      Physical Exam    Neuro :  no focal deficits  Respiratory: CTA B/L  CV: RRR, S1S2, no murmurs,   Abdominal: Soft, NT, ND +BS,  Extremities: No edema, + peripheral pulses    ASSESSMENT    low abdominal soft tissue mass with   numerous retroperitoneal lymph nodes   r/o malignancy  h/o Meniscus tear  Anxiety  Asthma  Leukemia  S/P breast biopsy, right  S/P abdominoplasty  S/P myomectomy      PLAN      ct abd-pelv results noted above  cea, ca125, ca 15-3 elevated  ca 19-9 wnl  heme-onc cons noted  surg cons  ir for possible bx  gi cons  pt possibly needs egd and colonoscopy  cont current meds

## 2018-12-30 NOTE — CONSULT NOTE ADULT - ASSESSMENT
I saw and examined the patient at bedside. I reviewed all applicable imaging.   Plan:  IR consult for possible biopsy of mass  Colonoscopy to follow after biopsy  Full consult to follow

## 2018-12-30 NOTE — PROGRESS NOTE ADULT - SUBJECTIVE AND OBJECTIVE BOX
HPI:    condition same  no fever, N/V/D  pain is less if stay with liquid diet    ROS:  Negative except for:    MEDICATIONS  (STANDING):  heparin  Injectable 5000 Unit(s) SubCutaneous every 12 hours  influenza   Vaccine 0.5 milliLiter(s) IntraMuscular once  melatonin 3 milliGRAM(s) Oral at bedtime  traMADol 25 milliGRAM(s) Oral two times a day    MEDICATIONS  (PRN):  acetaminophen   Tablet .. 650 milliGRAM(s) Oral every 6 hours PRN Moderate Pain (4 - 6)  ketorolac   Injectable 15 milliGRAM(s) IV Push every 6 hours PRN Moderate Pain (4 - 6)  LORazepam     Tablet 1 milliGRAM(s) Oral daily PRN Anxiety  ondansetron Injectable 4 milliGRAM(s) IV Push every 4 hours PRN Nausea and/or Vomiting      Allergies    No Known Allergies    Intolerances        Vital Signs Last 24 Hrs  T(C): 36.4 (30 Dec 2018 05:27), Max: 36.8 (29 Dec 2018 21:05)  T(F): 97.5 (30 Dec 2018 05:27), Max: 98.2 (29 Dec 2018 21:05)  HR: 78 (30 Dec 2018 05:27) (78 - 93)  BP: 95/46 (30 Dec 2018 05:27) (95/46 - 99/62)  BP(mean): --  RR: 18 (30 Dec 2018 05:27) (16 - 18)  SpO2: 100% (30 Dec 2018 05:27) (99% - 100%)    PHYSICAL EXAM  General: adult in NAD  HEENT: clear oropharynx, anicteric sclera, pink conjunctiva  Neck: supple  CV: normal S1/S2 with no murmur rubs or gallops  Lungs: positive air movement b/l ant lungs,clear to auscultation, no wheezes, no rales  Abdomen: soft non-tender non-distended, no hepatosplenomegaly  Ext: no clubbing cyanosis or edema  Skin: no rashes and no petechiae  Neuro: alert and oriented X 4, no focal deficits  LABS:                          11.1   7.1   )-----------( 193      ( 29 Dec 2018 06:08 )             35.3         Mean Cell Volume : 87.3 fl  Mean Cell Hemoglobin : 27.4 pg  Mean Cell Hemoglobin Concentration : 31.4 gm/dL  Auto Neutrophil # : 4.9 K/uL  Auto Lymphocyte # : 1.0 K/uL  Auto Monocyte # : 0.6 K/uL  Auto Eosinophil # : 0.7 K/uL  Auto Basophil # : 0.0 K/uL  Auto Neutrophil % : 68.2 %  Auto Lymphocyte % : 14.1 %  Auto Monocyte % : 7.8 %  Auto Eosinophil % : 9.5 %  Auto Basophil % : 0.3 %    Serial CBC  Hematocrit 35.3  Hemoglobin 11.1  Plat 193  RBC 4.04  WBC 7.1  Serial CBC  Hematocrit 39.7  Hemoglobin 12.4  Plat 243  RBC 4.60  WBC 7.6    12-29    139  |  103  |  15  ----------------------------<  103<H>  4.1   |  30  |  0.56    Ca    8.8      29 Dec 2018 06:08  Phos  4.0     12-29  Mg     2.0     12-29    TPro  7.6  /  Alb  2.8<L>  /  TBili  0.4  /  DBili  x   /  AST  17  /  ALT  24  /  AlkPhos  103  12-29          Folate, Serum: 12.4 ng/mL (12-29 @ 09:09)  Vitamin B12, Serum: >2000 pg/mL (12-29 @ 09:09)            BLOOD SMEAR INTERPRETATION:       RADIOLOGY & ADDITIONAL STUDIES:

## 2018-12-31 RX ADMIN — Medication 1 MILLIGRAM(S): at 12:25

## 2018-12-31 RX ADMIN — Medication 650 MILLIGRAM(S): at 18:10

## 2018-12-31 RX ADMIN — Medication 3 MILLIGRAM(S): at 22:38

## 2018-12-31 RX ADMIN — Medication 650 MILLIGRAM(S): at 04:05

## 2018-12-31 RX ADMIN — Medication 650 MILLIGRAM(S): at 22:38

## 2018-12-31 RX ADMIN — Medication 650 MILLIGRAM(S): at 05:00

## 2018-12-31 RX ADMIN — Medication 650 MILLIGRAM(S): at 23:10

## 2018-12-31 RX ADMIN — Medication 650 MILLIGRAM(S): at 17:20

## 2018-12-31 NOTE — PROGRESS NOTE ADULT - SUBJECTIVE AND OBJECTIVE BOX
PGY 1 Note discussed with supervising resident and primary attending    Patient is a 48y old  Female who presents with a chief complaint of abdominal pain (31 Dec 2018 12:54)      INTERVAL HPI/OVERNIGHT EVENTS: No acute events overnight, remains afebrile; HD stable  Pt clinically stable, only c/o anxiety regarding upcoming biopsy, reports on standing Ativan 1 mg QD, same regimen started.  Biopsy will be done on Wednesday after CT machine repair.  No N/V/abdominal pain noted today.    MEDICATIONS  (STANDING):  heparin  Injectable 5000 Unit(s) SubCutaneous every 12 hours  influenza   Vaccine 0.5 milliLiter(s) IntraMuscular once  melatonin 3 milliGRAM(s) Oral at bedtime    MEDICATIONS  (PRN):  acetaminophen   Tablet .. 650 milliGRAM(s) Oral every 6 hours PRN Moderate Pain (4 - 6)  LORazepam     Tablet 1 milliGRAM(s) Oral daily PRN Anxiety  ondansetron Injectable 4 milliGRAM(s) IV Push every 4 hours PRN Nausea and/or Vomiting      __________________________________________________  REVIEW OF SYSTEMS:    CONSTITUTIONAL: No fever,   EYES: no acute visual disturbances  NECK: No pain or stiffness  RESPIRATORY: No cough; No shortness of breath  CARDIOVASCULAR: No chest pain, no palpitations  GASTROINTESTINAL: No pain. No nausea or vomiting; No diarrhea   NEUROLOGICAL: No headache or numbness, no tremors  MUSCULOSKELETAL: No joint pain, no muscle pain  GENITOURINARY: no dysuria, no frequency, no hesitancy      Vital Signs Last 24 Hrs  T(C): 36.4 (31 Dec 2018 05:26), Max: 36.9 (30 Dec 2018 14:58)  T(F): 97.6 (31 Dec 2018 05:26), Max: 98.4 (30 Dec 2018 14:58)  HR: 88 (31 Dec 2018 05:26) (88 - 94)  BP: 100/64 (31 Dec 2018 05:26) (96/54 - 104/67)  BP(mean): --  RR: 18 (31 Dec 2018 05:26) (18 - 18)  SpO2: 100% (31 Dec 2018 05:26) (97% - 100%)    ________________________________________________  PHYSICAL EXAM:    GENERAL: NAD  HEENT: Normocephalic;  conjunctivae and sclerae clear; moist mucous membranes;   NECK : supple  CHEST/LUNG: Clear to auscultation bilaterally with good air entry   HEART: S1 S2  regular; no murmurs, gallops or rubs  ABDOMEN: Soft, Nontender, Nondistended; Bowel sounds present  EXTREMITIES: no cyanosis; no edema; no calf tenderness  SKIN: warm and dry; no rash  NERVOUS SYSTEM:  Awake and alert; no new deficits    _________________________________________________  LABS:              CAPILLARY BLOOD GLUCOSE            RADIOLOGY & ADDITIONAL TESTS:    Imaging Personally Reviewed:  YES    Consultant(s) Notes Reviewed:   YES    Care Discussed with Consultants :     Plan of care was discussed with patient and /or primary care giver; all questions and concerns were addressed and care was aligned with patient's wishes.

## 2018-12-31 NOTE — PROGRESS NOTE ADULT - ASSESSMENT
49 yo F  presents with increasing pain at lower abdomen for a few months. The pain gets worse after eating.  CT showed a retroperitoneal mass with lymph nodes. High CEA,  and Ca 15.3.  Patient and examined lying in bed in NAD. Denies abdominal pain, N/V. No diarrhea. Reports formed stool today. No blood no mucous. VSS Afebrile.

## 2018-12-31 NOTE — PROGRESS NOTE ADULT - SUBJECTIVE AND OBJECTIVE BOX
COVERING FOR DR LERNER    Patient is a 48y old  Female with large abdominal mass  Pt was seen between 8-9 in AM today    Pt very concerned about the diagnosis  has mild pain. no nausea, no headaches, no chest pain         PAST MEDICAL & SURGICAL HISTORY:  Meniscus tear  Anxiety  Asthma  Leukemia: age 23 had chemo and radiation  Port catheter in place  S/P breast biopsy, right  S/P abdominoplasty  S/P myomectomy      SOCIAL HISTORY: lives with brother. has no children    FAMILY HISTORY:  Family history of cancer (Father, Mother)      MEDICATIONS  (STANDING):  heparin  Injectable 5000 Unit(s) SubCutaneous every 12 hours  influenza   Vaccine 0.5 milliLiter(s) IntraMuscular once  melatonin 3 milliGRAM(s) Oral at bedtime    MEDICATIONS  (PRN):  acetaminophen   Tablet .. 650 milliGRAM(s) Oral every 6 hours PRN Moderate Pain (4 - 6)  LORazepam     Tablet 1 milliGRAM(s) Oral daily PRN Anxiety  ondansetron Injectable 4 milliGRAM(s) IV Push every 4 hours PRN Nausea and/or Vomiting      Allergies    No Known Allergies    Intolerances        Vital Signs Last 24 Hrs  T(C): 36.4 (31 Dec 2018 05:26), Max: 36.8 (30 Dec 2018 21:20)  T(F): 97.6 (31 Dec 2018 05:26), Max: 98.2 (30 Dec 2018 21:20)  HR: 80 (31 Dec 2018 14:27) (80 - 94)  BP: 116/69 (31 Dec 2018 14:27) (96/54 - 116/69)  BP(mean): --  RR: 17 (31 Dec 2018 14:27) (17 - 18)  SpO2: 100% (31 Dec 2018 14:27) (99% - 100%)    PHYSICAL EXAM  General: adult in NAD  HEENT: clear oropharynx, anicteric sclera, pink conjunctiva  Neck: supple  CV: normal S1/S2 with no murmur rubs or gallops  Lungs: positive air movement b/l ant lungs,clear to auscultation, no wheezes, no rales  Abdomen: soft non-tender non-distended, no hepatosplenomegaly, has surgical scar  Ext: no clubbing cyanosis or edema  Skin: no rashes and no petechiae  Neuro: alert and oriented X 3, no focal deficits      LABS:            Serial CBC's  12-29 @ 06:08  Hct-35.3 / Hgb-11.1 / Plat-193 / RBC-4.04 / WBC-7.1  Serial CBC's  12-28 @ 17:31  Hct-39.7 / Hgb-12.4 / Plat-243 / RBC-4.60 / WBC-7.6                  Folate, Serum: 12.4 ng/mL (12-29 @ 09:09)  Vitamin B12, Serum: >2000 pg/mL (12-29 @ 09:09)

## 2018-12-31 NOTE — PROGRESS NOTE ADULT - ASSESSMENT
Abdominal mass with lymphadenopathy, with high tumor markers  The differential diagnosis is wide  Await IR guided biopsy . That will direct the next line of investigation    Discussed in detail with the patient.    Nasir Gondal  [phone]  Covering for Dr Pandey

## 2018-12-31 NOTE — PROGRESS NOTE ADULT - SUBJECTIVE AND OBJECTIVE BOX
Patient is a 48y old  Female who presents with a chief complaint of abdominal pain (30 Dec 2018 10:47)    pt seen in icu [  ], reg med floor [  x ], bed [ x ], chair at bedside [   ], a+o x3 [ x ], lethargic [  ],  nad [x  ]      Allergies    No Known Allergies        Vitals    T(F): 97.6 (12-31-18 @ 05:26), Max: 98.4 (12-30-18 @ 14:58)  HR: 88 (12-31-18 @ 05:26) (88 - 94)  BP: 100/64 (12-31-18 @ 05:26) (96/54 - 104/67)  RR: 18 (12-31-18 @ 05:26) (18 - 18)  SpO2: 100% (12-31-18 @ 05:26) (97% - 100%)  Wt(kg): --  CAPILLARY BLOOD GLUCOSE          Labs        Radiology Results      Meds    MEDICATIONS  (STANDING):  heparin  Injectable 5000 Unit(s) SubCutaneous every 12 hours  influenza   Vaccine 0.5 milliLiter(s) IntraMuscular once  melatonin 3 milliGRAM(s) Oral at bedtime      MEDICATIONS  (PRN):  acetaminophen   Tablet .. 650 milliGRAM(s) Oral every 6 hours PRN Moderate Pain (4 - 6)  LORazepam     Tablet 1 milliGRAM(s) Oral daily PRN Anxiety  ondansetron Injectable 4 milliGRAM(s) IV Push every 4 hours PRN Nausea and/or Vomiting      Physical Exam      Neuro :  no focal deficits  Respiratory: CTA B/L  CV: RRR, S1S2, no murmurs,   Abdominal: Soft, NT, ND +BS,  Extremities: No edema, + peripheral pulses    ASSESSMENT    low abdominal soft tissue mass with   numerous retroperitoneal lymph nodes   r/o malignancy  h/o Meniscus tear  Anxiety  Asthma  Leukemia  S/P breast biopsy, right  S/P abdominoplasty  S/P myomectomy      PLAN      ct abd-pelv results noted   cea, ca125, ca 15-3 elevated  ca 19-9 wnl  heme-onc f/u noted  surg cons  ir for bx on wednesday  gi cons noted  Colonoscopy to follow after biopsy  cont current meds

## 2018-12-31 NOTE — PROGRESS NOTE ADULT - ASSESSMENT
48F, from home, lives alone, w/ PMHx anxiety disorder, 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 Patient mentions pain went away for a month and came back after having sexual intercourse. Patient attributes symptoms to sexual activity, and mentions she's symptom free otherwise. Has visited [facility] ER multiple times for the past 3 months, for the same complaint and has was told she had abdominal spasms. Mentions being unable to move out of bed due to symptoms. Denies night sweats, chills, fevers, weight loss, fatigue, diarrhea or any other complaint. Refers taking Tylenol and Aleve at home with minimal improvement of symptoms. Patient completed 3-4 yrs of chemo, and afterwards has not followed with heme/onc; mentions only following up with PCP. Patient reports a history of umbilical hernia which was repaired several years ago. Patient denies having any colonoscopy or mammography. Reports having a Pap smear 9 mos ago which was normal, and breast examination as well.     *Patient seems to have some degree of cognitive impairment. Primary team to contact family for collateral information*

## 2018-12-31 NOTE — PROGRESS NOTE ADULT - SUBJECTIVE AND OBJECTIVE BOX
Summary:  Patient is a 48y old  Female who presents with a chief complaint of abdominal pain    Subjective: No N/V      Objective:    MEDICATIONS  (STANDING):  heparin  Injectable 5000 Unit(s) SubCutaneous every 12 hours  influenza   Vaccine 0.5 milliLiter(s) IntraMuscular once  melatonin 3 milliGRAM(s) Oral at bedtime    MEDICATIONS  (PRN):  acetaminophen   Tablet .. 650 milliGRAM(s) Oral every 6 hours PRN Moderate Pain (4 - 6)  LORazepam     Tablet 1 milliGRAM(s) Oral daily PRN Anxiety  ondansetron Injectable 4 milliGRAM(s) IV Push every 4 hours PRN Nausea and/or Vomiting              Vital Signs Last 24 Hrs  T(C): 36.4 (31 Dec 2018 05:26), Max: 36.9 (30 Dec 2018 14:58)  T(F): 97.6 (31 Dec 2018 05:26), Max: 98.4 (30 Dec 2018 14:58)  HR: 88 (31 Dec 2018 05:26) (88 - 94)  BP: 100/64 (31 Dec 2018 05:26) (96/54 - 104/67)  BP(mean): --  RR: 18 (31 Dec 2018 05:26) (18 - 18)  SpO2: 100% (31 Dec 2018 05:26) (97% - 100%)      General:  Well developed, well nourished, alert and active, no pallor, NAD.  HEENT:    Normal appearance of conjunctiva, ears, nose, lips, oropharynx, and oral mucosa, anicteric.  Neck:  No masses, no asymmetry.  Lymph Nodes:  No lymphadenopathy.   Cardiovascular:  RRR normal S1/S2, no murmur.  Respiratory:  CTA B/L, normal respiratory effort.   Abdominal:   soft non-tender non-distended, no hepatosplenomegaly, no rebound, no guarding  Extremities:   No clubbing or cyanosis, normal capillary refill, no edema.   Skin:   No rash, jaundice, lesions, eczema.   Musculoskeletal:  No joint swelling, erythema or tenderness.   Neuro: No focal deficits.   Other:       LABS:                RADIOLOGY & ADDITIONAL TESTS:

## 2019-01-01 LAB
ANION GAP SERPL CALC-SCNC: 9 MMOL/L — SIGNIFICANT CHANGE UP (ref 5–17)
BUN SERPL-MCNC: 13 MG/DL — SIGNIFICANT CHANGE UP (ref 7–18)
CALCIUM SERPL-MCNC: 8.6 MG/DL — SIGNIFICANT CHANGE UP (ref 8.4–10.5)
CHLORIDE SERPL-SCNC: 102 MMOL/L — SIGNIFICANT CHANGE UP (ref 96–108)
CO2 SERPL-SCNC: 26 MMOL/L — SIGNIFICANT CHANGE UP (ref 22–31)
CREAT SERPL-MCNC: 0.5 MG/DL — SIGNIFICANT CHANGE UP (ref 0.5–1.3)
GLUCOSE SERPL-MCNC: 102 MG/DL — HIGH (ref 70–99)
HCT VFR BLD CALC: 35.3 % — SIGNIFICANT CHANGE UP (ref 34.5–45)
HGB BLD-MCNC: 11.4 G/DL — LOW (ref 11.5–15.5)
MCHC RBC-ENTMCNC: 27.9 PG — SIGNIFICANT CHANGE UP (ref 27–34)
MCHC RBC-ENTMCNC: 32.3 GM/DL — SIGNIFICANT CHANGE UP (ref 32–36)
MCV RBC AUTO: 86.4 FL — SIGNIFICANT CHANGE UP (ref 80–100)
PLATELET # BLD AUTO: 207 K/UL — SIGNIFICANT CHANGE UP (ref 150–400)
POTASSIUM SERPL-MCNC: 3.8 MMOL/L — SIGNIFICANT CHANGE UP (ref 3.5–5.3)
POTASSIUM SERPL-SCNC: 3.8 MMOL/L — SIGNIFICANT CHANGE UP (ref 3.5–5.3)
RBC # BLD: 4.08 M/UL — SIGNIFICANT CHANGE UP (ref 3.8–5.2)
RBC # FLD: 12.8 % — SIGNIFICANT CHANGE UP (ref 10.3–14.5)
SODIUM SERPL-SCNC: 137 MMOL/L — SIGNIFICANT CHANGE UP (ref 135–145)
WBC # BLD: 7.6 K/UL — SIGNIFICANT CHANGE UP (ref 3.8–10.5)
WBC # FLD AUTO: 7.6 K/UL — SIGNIFICANT CHANGE UP (ref 3.8–10.5)

## 2019-01-01 RX ORDER — IBUPROFEN 200 MG
400 TABLET ORAL ONCE
Qty: 0 | Refills: 0 | Status: COMPLETED | OUTPATIENT
Start: 2019-01-01 | End: 2019-01-01

## 2019-01-01 RX ADMIN — Medication 1 MILLIGRAM(S): at 04:16

## 2019-01-01 RX ADMIN — Medication 650 MILLIGRAM(S): at 23:54

## 2019-01-01 RX ADMIN — Medication 1 MILLIGRAM(S): at 23:56

## 2019-01-01 RX ADMIN — Medication 400 MILLIGRAM(S): at 05:20

## 2019-01-01 RX ADMIN — Medication 400 MILLIGRAM(S): at 04:29

## 2019-01-01 RX ADMIN — Medication 30 MILLILITER(S): at 03:09

## 2019-01-01 RX ADMIN — Medication 3 MILLIGRAM(S): at 22:11

## 2019-01-01 NOTE — PROGRESS NOTE ADULT - ASSESSMENT
48F, from home, lives alone, w/ PMHx anxiety disorder, 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 Patient mentions pain went away for a month and came back after having sexual intercourse. Patient attributes symptoms to sexual activity, and mentions she's symptom free otherwise. Has visited [facility] ER multiple times for the past 3 months, for the same complaint and has was told she had abdominal spasms. Mentions being unable to move out of bed due to symptoms. Denies night sweats, chills, fevers, weight loss, fatigue, diarrhea or any other complaint. Refers taking Tylenol and Aleve at home with minimal improvement of symptoms. Patient completed 3-4 yrs of chemo, and afterwards has not followed with heme/onc; mentions only following up with PCP. Patient reports a history of umbilical hernia which was repaired several years ago. Patient denies having any colonoscopy or mammography. Reports having a Pap smear 9 mos ago which was normal, and breast examination as well.     *Patient seems to have some degree of cognitive impairment. Primary team to contact family for collateral information*

## 2019-01-01 NOTE — PROGRESS NOTE ADULT - SUBJECTIVE AND OBJECTIVE BOX
PGY 1 Note discussed with supervising resident and primary attending    Patient is a 48y old  Female who presents with a chief complaint of abdominal pain (01 Jan 2019 08:49)      INTERVAL HPI/OVERNIGHT EVENTS: No acute events overnight, remains afebrile; HD stable, H/H stable, WBC WNL  Pt remains clinically stable, no c/o abdominal pain or tenderness. Pt pleasant, walking, talking, tolerating diet well.    MEDICATIONS  (STANDING):  heparin  Injectable 5000 Unit(s) SubCutaneous every 12 hours  influenza   Vaccine 0.5 milliLiter(s) IntraMuscular once  melatonin 3 milliGRAM(s) Oral at bedtime    MEDICATIONS  (PRN):  acetaminophen   Tablet .. 650 milliGRAM(s) Oral every 6 hours PRN Moderate Pain (4 - 6)  aluminum hydroxide/magnesium hydroxide/simethicone Suspension 30 milliLiter(s) Oral every 6 hours PRN Dyspepsia  LORazepam     Tablet 1 milliGRAM(s) Oral daily PRN Anxiety  ondansetron Injectable 4 milliGRAM(s) IV Push every 4 hours PRN Nausea and/or Vomiting      __________________________________________________  REVIEW OF SYSTEMS:    CONSTITUTIONAL: No fever,   EYES: no acute visual disturbances  NECK: No pain or stiffness  RESPIRATORY: No cough; No shortness of breath  CARDIOVASCULAR: No chest pain, no palpitations  GASTROINTESTINAL: No pain. No nausea or vomiting; No diarrhea   NEUROLOGICAL: No headache or numbness, no tremors  MUSCULOSKELETAL: No joint pain, no muscle pain  GENITOURINARY: no dysuria, no frequency, no hesitancy        Vital Signs Last 24 Hrs  T(C): 37 (01 Jan 2019 05:20), Max: 37 (31 Dec 2018 21:37)  T(F): 98.6 (01 Jan 2019 05:20), Max: 98.6 (31 Dec 2018 21:37)  HR: 84 (01 Jan 2019 05:20) (80 - 84)  BP: 101/62 (01 Jan 2019 05:20) (101/62 - 116/69)  BP(mean): --  RR: 18 (01 Jan 2019 05:20) (17 - 18)  SpO2: 98% (01 Jan 2019 05:20) (98% - 100%)    ________________________________________________  PHYSICAL EXAM:    GENERAL: NAD  HEENT: Normocephalic;  conjunctivae and sclerae clear; moist mucous membranes;   NECK : supple  CHEST/LUNG: Clear to auscultation bilaterally with good air entry   HEART: S1 S2  regular; no murmurs, gallops or rubs  ABDOMEN: Soft, Nontender, Nondistended; Bowel sounds present  EXTREMITIES: no cyanosis; no edema; no calf tenderness  SKIN: warm and dry; no rash  NERVOUS SYSTEM:  Awake and alert; no new deficits    _________________________________________________  LABS:                        11.4   7.6   )-----------( 207      ( 01 Jan 2019 06:18 )             35.3     01-01    137  |  102  |  13  ----------------------------<  102<H>  3.8   |  26  |  0.50    Ca    8.6      01 Jan 2019 06:18          CAPILLARY BLOOD GLUCOSE            RADIOLOGY & ADDITIONAL TESTS:    Imaging Personally Reviewed:  YES    Consultant(s) Notes Reviewed:   YES    Care Discussed with Consultants :     Plan of care was discussed with patient and /or primary care giver; all questions and concerns were addressed and care was aligned with patient's wishes.

## 2019-01-01 NOTE — PROGRESS NOTE ADULT - SUBJECTIVE AND OBJECTIVE BOX
Patient is a 48y old  Female who presents with a chief complaint of abdominal pain (31 Dec 2018 15:31)    pt seen in icu [  ], reg med floor [  x ], bed [ x ], chair at bedside [   ], a+o x3 [ x ], lethargic [  ],  nad [x  ]        Allergies    No Known Allergies        Vitals    T(F): 98.6 (01-01-19 @ 05:20), Max: 98.6 (12-31-18 @ 21:37)  HR: 84 (01-01-19 @ 05:20) (80 - 84)  BP: 101/62 (01-01-19 @ 05:20) (101/62 - 116/69)  RR: 18 (01-01-19 @ 05:20) (17 - 18)  SpO2: 98% (01-01-19 @ 05:20) (98% - 100%)  Wt(kg): --  CAPILLARY BLOOD GLUCOSE          Labs                          11.4   7.6   )-----------( 207      ( 01 Jan 2019 06:18 )             35.3       01-01    137  |  102  |  13  ----------------------------<  102<H>  3.8   |  26  |  0.50    Ca    8.6      01 Jan 2019 06:18                  Radiology Results      Meds    MEDICATIONS  (STANDING):  heparin  Injectable 5000 Unit(s) SubCutaneous every 12 hours  influenza   Vaccine 0.5 milliLiter(s) IntraMuscular once  melatonin 3 milliGRAM(s) Oral at bedtime      MEDICATIONS  (PRN):  acetaminophen   Tablet .. 650 milliGRAM(s) Oral every 6 hours PRN Moderate Pain (4 - 6)  aluminum hydroxide/magnesium hydroxide/simethicone Suspension 30 milliLiter(s) Oral every 6 hours PRN Dyspepsia  LORazepam     Tablet 1 milliGRAM(s) Oral daily PRN Anxiety  ondansetron Injectable 4 milliGRAM(s) IV Push every 4 hours PRN Nausea and/or Vomiting      Physical Exam      Neuro :  no focal deficits  Respiratory: CTA B/L  CV: RRR, S1S2, no murmurs,   Abdominal: Soft, NT, ND +BS,  Extremities: No edema, + peripheral pulses    ASSESSMENT    low abdominal soft tissue mass with   numerous retroperitoneal lymph nodes   r/o malignancy  h/o Meniscus tear  Anxiety  Asthma  Leukemia  S/P breast biopsy, right  S/P abdominoplasty  S/P myomectomy      PLAN      ct abd-pelv results noted   cea, ca125, ca 15-3 elevated  ca 19-9 wnl  heme-onc f/u noted  surg cons  ir for bx in wednesday  gi f/u  Colonoscopy to follow after biopsy  cont current meds

## 2019-01-02 DIAGNOSIS — J45.909 UNSPECIFIED ASTHMA, UNCOMPLICATED: ICD-10-CM

## 2019-01-02 LAB
ANION GAP SERPL CALC-SCNC: 10 MMOL/L — SIGNIFICANT CHANGE UP (ref 5–17)
APTT BLD: 26.6 SEC — LOW (ref 27.5–36.3)
BUN SERPL-MCNC: 12 MG/DL — SIGNIFICANT CHANGE UP (ref 7–18)
CALCIUM SERPL-MCNC: 8.9 MG/DL — SIGNIFICANT CHANGE UP (ref 8.4–10.5)
CHLORIDE SERPL-SCNC: 103 MMOL/L — SIGNIFICANT CHANGE UP (ref 96–108)
CO2 SERPL-SCNC: 25 MMOL/L — SIGNIFICANT CHANGE UP (ref 22–31)
CREAT SERPL-MCNC: 0.5 MG/DL — SIGNIFICANT CHANGE UP (ref 0.5–1.3)
GLUCOSE SERPL-MCNC: 97 MG/DL — SIGNIFICANT CHANGE UP (ref 70–99)
HCT VFR BLD CALC: 35.8 % — SIGNIFICANT CHANGE UP (ref 34.5–45)
HGB BLD-MCNC: 11.4 G/DL — LOW (ref 11.5–15.5)
INR BLD: 1.27 RATIO — HIGH (ref 0.88–1.16)
MCHC RBC-ENTMCNC: 27.4 PG — SIGNIFICANT CHANGE UP (ref 27–34)
MCHC RBC-ENTMCNC: 31.8 GM/DL — LOW (ref 32–36)
MCV RBC AUTO: 86.2 FL — SIGNIFICANT CHANGE UP (ref 80–100)
PLATELET # BLD AUTO: 208 K/UL — SIGNIFICANT CHANGE UP (ref 150–400)
POTASSIUM SERPL-MCNC: 3.7 MMOL/L — SIGNIFICANT CHANGE UP (ref 3.5–5.3)
POTASSIUM SERPL-SCNC: 3.7 MMOL/L — SIGNIFICANT CHANGE UP (ref 3.5–5.3)
PROTHROM AB SERPL-ACNC: 14.2 SEC — HIGH (ref 10–12.9)
RBC # BLD: 4.15 M/UL — SIGNIFICANT CHANGE UP (ref 3.8–5.2)
RBC # FLD: 12.6 % — SIGNIFICANT CHANGE UP (ref 10.3–14.5)
SODIUM SERPL-SCNC: 138 MMOL/L — SIGNIFICANT CHANGE UP (ref 135–145)
WBC # BLD: 7.3 K/UL — SIGNIFICANT CHANGE UP (ref 3.8–10.5)
WBC # FLD AUTO: 7.3 K/UL — SIGNIFICANT CHANGE UP (ref 3.8–10.5)

## 2019-01-02 RX ADMIN — Medication 1 MILLIGRAM(S): at 16:03

## 2019-01-02 RX ADMIN — Medication 650 MILLIGRAM(S): at 00:20

## 2019-01-02 RX ADMIN — Medication 3 MILLIGRAM(S): at 21:35

## 2019-01-02 NOTE — CONSULT NOTE ADULT - ASSESSMENT
48F, from home, lives alone, w/ PMHx anxiety disorder, 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 Patient mentions pain went away for a month and came back after having sexual intercourse. Patient attributes symptoms to sexual activity, and mentions she's symptom free otherwise. Has visited [facility] ER multiple times for the past 3 months, for the same complaint and has was told she had abdominal spasms. Mentions being unable to move out of bed due to symptoms. Denies night sweats, chills, fevers, weight loss, fatigue, diarrhea or any other complaint. Refers taking Tylenol and Aleve at home with minimal improvement of symptoms. Patient completed 3-4 yrs of chemo, and afterwards has not followed with heme/onc; mentions only following up with PCP. Patient reports a history of umbilical hernia which was repaired several years ago. Patient denies having any colonoscopy or mammography. Reports having a Pap smear 9 mos ago which was normal, and breast examination as well.

## 2019-01-02 NOTE — CONSULT NOTE ADULT - PROBLEM SELECTOR RECOMMENDATION 9
C/w supportive care  IR for bx  today  breast vs GI vs Gyn cancer  Further investigations to be pursued after biopsy  Plan for colonoscopy after IR biopsy to r/o GI source.  GI follow up   Hem/onc follow up

## 2019-01-02 NOTE — CONSULT NOTE ADULT - SUBJECTIVE AND OBJECTIVE BOX
PULMONARY CONSULT NOTE      GM OAKLEY  MRN-[id number]    Patient is a 48y old  Female who presents with a chief complaint of abdominal pain (01 Jan 2019 11:00)     History of Present Illness:  Reason for Admission: abdominal pain	  History of Present Illness: 	  48F, from home, lives alone, w/ PMHx anxiety disorder, 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 Patient mentions pain went away for a month and came back after having sexual intercourse. Patient attributes symptoms to sexual activity, and mentions she's symptom free otherwise. Has visited [facility] ER multiple times for the past 3 months, for the same complaint and has was told she had abdominal spasms. Mentions being unable to move out of bed due to symptoms. Denies night sweats, chills, fevers, weight loss, fatigue, diarrhea or any other complaint. Refers taking Tylenol and Aleve at home with minimal improvement of symptoms. Patient completed 3-4 yrs of chemo, and afterwards has not followed with heme/onc; mentions only following up with PCP. Patient reports a history of umbilical hernia which was repaired several years ago. Patient denies having any colonoscopy or mammography. Reports having a Pap smear 9 mos ago which was normal, and breast examination as well.     HISTORY OF PRESENT ILLNESS: As above , awake ,alert ,lying in bed in NAD.     MEDICATIONS  (STANDING):  heparin  Injectable 5000 Unit(s) SubCutaneous every 12 hours  influenza   Vaccine 0.5 milliLiter(s) IntraMuscular once  melatonin 3 milliGRAM(s) Oral at bedtime      MEDICATIONS  (PRN):  acetaminophen   Tablet .. 650 milliGRAM(s) Oral every 6 hours PRN Moderate Pain (4 - 6)  aluminum hydroxide/magnesium hydroxide/simethicone Suspension 30 milliLiter(s) Oral every 6 hours PRN Dyspepsia  LORazepam     Tablet 1 milliGRAM(s) Oral daily PRN Anxiety  ondansetron Injectable 4 milliGRAM(s) IV Push every 4 hours PRN Nausea and/or Vomiting      Allergies    No Known Allergies    Intolerances        PAST MEDICAL & SURGICAL HISTORY:  Meniscus tear  Anxiety  Asthma  Leukemia: age 23 had chemo and radiation  Port catheter in place  S/P breast biopsy, right  S/P abdominoplasty  S/P myomectomy      FAMILY HISTORY:  Family history of cancer (Father, Mother)      SOCIAL HISTORY  Smoking History:     REVIEW OF SYSTEMS:    CONSTITUTIONAL:  No fevers, chills, sweats    HEENT:  Eyes:  No diplopia or blurred vision. ENT:  No earache, sore throat or runny nose.    CARDIOVASCULAR:  No pressure, squeezing, tightness, or heaviness about the chest; no palpitations.    RESPIRATORY:  Per HPI    GASTROINTESTINAL:  No abdominal pain, nausea, vomiting or diarrhea.    GENITOURINARY:  No dysuria, frequency or urgency.    NEUROLOGIC:  No paresthesias, fasciculations, seizures or weakness.    PSYCHIATRIC:  No disorder of thought or mood.    Vital Signs Last 24 Hrs  T(C): 36.6 (02 Jan 2019 05:09), Max: 36.8 (01 Jan 2019 14:39)  T(F): 97.9 (02 Jan 2019 05:09), Max: 98.3 (01 Jan 2019 14:39)  HR: 91 (02 Jan 2019 05:09) (76 - 91)  BP: 104/66 (02 Jan 2019 05:09) (104/66 - 123/80)  BP(mean): --  RR: 18 (02 Jan 2019 05:09) (17 - 18)  SpO2: 99% (02 Jan 2019 05:09) (99% - 100%)  I&O's Detail      PHYSICAL EXAMINATION:    GENERAL: The patient is a well-developed, well-nourished _____in no apparent distress.     HEENT: Head is normocephalic and atraumatic. Extraocular muscles are intact. Mucous membranes are moist.     NECK: Supple.     LUNGS: Clear to auscultation without wheezing, rales, or rhonchi. Respirations unlabored    HEART: Regular rate and rhythm without murmur.    ABDOMEN: Soft, nontender, and nondistended.  No hepatosplenomegaly is noted.    EXTREMITIES: Without any cyanosis, clubbing, rash, lesions or edema.    NEUROLOGIC: Grossly intact.      LABS:                        11.4   7.3   )-----------( 208      ( 02 Jan 2019 06:44 )             35.8     01-02    138  |  103  |  12  ----------------------------<  97  3.7   |  25  |  0.50    Ca    8.9      02 Jan 2019 06:44      PT/INR - ( 02 Jan 2019 06:44 )   PT: 14.2 sec;   INR: 1.27 ratio         PTT - ( 02 Jan 2019 06:44 )  PTT:26.6 sec                    MICROBIOLOGY:    RADIOLOGY & ADDITIONAL STUDIES:    CXR:  < from: Xray Chest 1 View- PORTABLE-Routine (12.29.18 @ 02:00) >  Impression:    No acute pulmonary process demonstrated.    < end of copied text >    Ct scan chest:  < from: CT Abdomen and Pelvis w/ Oral Cont and w/ IV Cont (12.28.18 @ 21:44) >  FINDINGS:    LOWER CHEST: Within normal limits.    < end of copied text >    ekg;    echo: PULMONARY CONSULT NOTE      GM OAKLEY  MRN-[id number]    Patient is a 48y old  Female who presents with a chief complaint of abdominal pain (01 Jan 2019 11:00)     History of Present Illness:  Reason for Admission: abdominal pain	  History of Present Illness: 	  48F, from home, lives alone, w/ PMHx anxiety disorder, 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 Patient mentions pain went away for a month and came back after having sexual intercourse. Patient attributes symptoms to sexual activity, and mentions she's symptom free otherwise. Has visited [facility] ER multiple times for the past 3 months, for the same complaint and has was told she had abdominal spasms. Mentions being unable to move out of bed due to symptoms. Denies night sweats, chills, fevers, weight loss, fatigue, diarrhea or any other complaint. Refers taking Tylenol and Aleve at home with minimal improvement of symptoms. Patient completed 3-4 yrs of chemo, and afterwards has not followed with heme/onc; mentions only following up with PCP. Patient reports a history of umbilical hernia which was repaired several years ago. Patient denies having any colonoscopy or mammography. Reports having a Pap smear 9 mos ago which was normal, and breast examination as well.     HISTORY OF PRESENT ILLNESS: As above , awake ,alert ,lying in bed in NAD. No recent asthma exacerbation.    MEDICATIONS  (STANDING):  heparin  Injectable 5000 Unit(s) SubCutaneous every 12 hours  influenza   Vaccine 0.5 milliLiter(s) IntraMuscular once  melatonin 3 milliGRAM(s) Oral at bedtime      MEDICATIONS  (PRN):  acetaminophen   Tablet .. 650 milliGRAM(s) Oral every 6 hours PRN Moderate Pain (4 - 6)  aluminum hydroxide/magnesium hydroxide/simethicone Suspension 30 milliLiter(s) Oral every 6 hours PRN Dyspepsia  LORazepam     Tablet 1 milliGRAM(s) Oral daily PRN Anxiety  ondansetron Injectable 4 milliGRAM(s) IV Push every 4 hours PRN Nausea and/or Vomiting      Allergies    No Known Allergies    Intolerances        PAST MEDICAL & SURGICAL HISTORY:  Meniscus tear  Anxiety  Asthma  Leukemia: age 23 had chemo and radiation  Port catheter in place  S/P breast biopsy, right  S/P abdominoplasty  S/P myomectomy      FAMILY HISTORY:  Family history of cancer (Father, Mother)      SOCIAL HISTORY  Smoking History:     REVIEW OF SYSTEMS:    CONSTITUTIONAL:  No fevers, chills, sweats    HEENT:  Eyes:  No diplopia or blurred vision. ENT:  No earache, sore throat or runny nose.    CARDIOVASCULAR:  No pressure, squeezing, tightness, or heaviness about the chest; no palpitations.    RESPIRATORY:  Per HPI    GASTROINTESTINAL:  No abdominal pain, nausea, vomiting or diarrhea.    GENITOURINARY:  No dysuria, frequency or urgency.    NEUROLOGIC:  No paresthesias, fasciculations, seizures or weakness.    PSYCHIATRIC:  No disorder of thought or mood.    Vital Signs Last 24 Hrs  T(C): 36.6 (02 Jan 2019 05:09), Max: 36.8 (01 Jan 2019 14:39)  T(F): 97.9 (02 Jan 2019 05:09), Max: 98.3 (01 Jan 2019 14:39)  HR: 91 (02 Jan 2019 05:09) (76 - 91)  BP: 104/66 (02 Jan 2019 05:09) (104/66 - 123/80)  BP(mean): --  RR: 18 (02 Jan 2019 05:09) (17 - 18)  SpO2: 99% (02 Jan 2019 05:09) (99% - 100%)  I&O's Detail      PHYSICAL EXAMINATION:    GENERAL: The patient is a well-developed, well-nourished _____in no apparent distress.     HEENT: Head is normocephalic and atraumatic. Extraocular muscles are intact. Mucous membranes are moist.     NECK: Supple.     LUNGS: Clear to auscultation without wheezing, rales, or rhonchi. Respirations unlabored    HEART: Regular rate and rhythm without murmur.    ABDOMEN: Soft, nontender, and nondistended.  No hepatosplenomegaly is noted.    EXTREMITIES: Without any cyanosis, clubbing, rash, lesions or edema.    NEUROLOGIC: Grossly intact.      LABS:                        11.4   7.3   )-----------( 208      ( 02 Jan 2019 06:44 )             35.8     01-02    138  |  103  |  12  ----------------------------<  97  3.7   |  25  |  0.50    Ca    8.9      02 Jan 2019 06:44      PT/INR - ( 02 Jan 2019 06:44 )   PT: 14.2 sec;   INR: 1.27 ratio         PTT - ( 02 Jan 2019 06:44 )  PTT:26.6 sec                    MICROBIOLOGY:    RADIOLOGY & ADDITIONAL STUDIES:    CXR:  < from: Xray Chest 1 View- PORTABLE-Routine (12.29.18 @ 02:00) >  Impression:    No acute pulmonary process demonstrated.    < end of copied text >    Ct scan chest:  < from: CT Abdomen and Pelvis w/ Oral Cont and w/ IV Cont (12.28.18 @ 21:44) >  FINDINGS:    LOWER CHEST: Within normal limits.    < end of copied text >    ekg;    echo:

## 2019-01-02 NOTE — PROGRESS NOTE ADULT - SUBJECTIVE AND OBJECTIVE BOX
PGY 1 Note discussed with supervising resident and primary attending    Patient is a 48y old  Female who presents with a chief complaint of abdominal pain (02 Jan 2019 09:51)      INTERVAL HPI/OVERNIGHT EVENTS: No acute events overnight, remains afebrile; HD stable, H/H stable, WBC WNL  Pt remains clinically stable, no new medical problems reported this AM.  Pt will go for abdominal mass biopsy today.    MEDICATIONS  (STANDING):  heparin  Injectable 5000 Unit(s) SubCutaneous every 12 hours  influenza   Vaccine 0.5 milliLiter(s) IntraMuscular once  melatonin 3 milliGRAM(s) Oral at bedtime    MEDICATIONS  (PRN):  acetaminophen   Tablet .. 650 milliGRAM(s) Oral every 6 hours PRN Moderate Pain (4 - 6)  aluminum hydroxide/magnesium hydroxide/simethicone Suspension 30 milliLiter(s) Oral every 6 hours PRN Dyspepsia  LORazepam     Tablet 1 milliGRAM(s) Oral daily PRN Anxiety  ondansetron Injectable 4 milliGRAM(s) IV Push every 4 hours PRN Nausea and/or Vomiting      __________________________________________________  REVIEW OF SYSTEMS:    CONSTITUTIONAL: No fever,   EYES: no acute visual disturbances  NECK: No pain or stiffness  RESPIRATORY: No cough; No shortness of breath  CARDIOVASCULAR: No chest pain, no palpitations  GASTROINTESTINAL: No pain. No nausea or vomiting; No diarrhea   NEUROLOGICAL: No headache or numbness, no tremors  MUSCULOSKELETAL: No joint pain, no muscle pain  GENITOURINARY: no dysuria, no frequency, no hesitancy    Vital Signs Last 24 Hrs  T(C): 36.6 (02 Jan 2019 05:09), Max: 36.8 (01 Jan 2019 14:39)  T(F): 97.9 (02 Jan 2019 05:09), Max: 98.3 (01 Jan 2019 14:39)  HR: 91 (02 Jan 2019 05:09) (76 - 91)  BP: 104/66 (02 Jan 2019 05:09) (104/66 - 123/80)  BP(mean): --  RR: 18 (02 Jan 2019 05:09) (17 - 18)  SpO2: 99% (02 Jan 2019 05:09) (99% - 100%)    ________________________________________________  PHYSICAL EXAM:    GENERAL: NAD  HEENT: Normocephalic;  conjunctivae and sclerae clear; moist mucous membranes;   NECK : supple  CHEST/LUNG: Clear to auscultation bilaterally with good air entry   HEART: S1 S2  regular; no murmurs, gallops or rubs  ABDOMEN: Soft, Nontender, Nondistended; Bowel sounds present  EXTREMITIES: no cyanosis; no edema; no calf tenderness  SKIN: warm and dry; no rash  NERVOUS SYSTEM:  Awake and alert; no new deficits  _________________________________________________  LABS:                        11.4   7.3   )-----------( 208      ( 02 Jan 2019 06:44 )             35.8     01-02    138  |  103  |  12  ----------------------------<  97  3.7   |  25  |  0.50    Ca    8.9      02 Jan 2019 06:44      PT/INR - ( 02 Jan 2019 06:44 )   PT: 14.2 sec;   INR: 1.27 ratio         PTT - ( 02 Jan 2019 06:44 )  PTT:26.6 sec    CAPILLARY BLOOD GLUCOSE            RADIOLOGY & ADDITIONAL TESTS:    Imaging Personally Reviewed:  YES    Consultant(s) Notes Reviewed:   YES    Care Discussed with Consultants :     Plan of care was discussed with patient and /or primary care giver; all questions and concerns were addressed and care was aligned with patient's wishes.

## 2019-01-02 NOTE — CHART NOTE - NSCHARTNOTEFT_GEN_A_CORE
PLEASE DO NOT ADMINISTER ANY NSAIDS UNTIL BIOPSY IS COMPLETED TOMORROW 1/3 Patient went down for biopsy this AM but sent back up by IR 2/2 misunderstanding. Pt received NSAID (Ibuprofen) on 1/1 at 4:29 AM, >24 hours prior to the procedure, but  it was thought that pt was given the medication at 4:29 AM this morning instead. Pt will go through the biopsy tomorrow by IR. RN warned that pt should not receive NSAID  although Tylenol can be given until the day of procedure tomorrow. This was also discussed with the patient. Patient went down for biopsy this AM but was sent back up by IR 2/2 misunderstanding. Pt received NSAID (Ibuprofen) on 1/1 at 4:29 AM, >24 hours prior to the procedure, but  it was thought that pt was given the medication at 4:29 AM this morning instead. Pt will go through the biopsy tomorrow by IR. RN warned that pt should not receive NSAIDs   although Tylenol can be given until the day of procedure tomorrow. This was also discussed with the patient.

## 2019-01-02 NOTE — PROGRESS NOTE ADULT - SUBJECTIVE AND OBJECTIVE BOX
Patient is a 48y old  Female who presents with a chief complaint of abdominal pain (02 Jan 2019 10:26)    pt seen in icu [  ], reg med floor [   ], bed [  ], chair at bedside [   ], a+o x3 [  ], lethargic [  ],  nad [  ]    zavala [  ], ngt [  ], peg [  ], et tube [  ], cent line [  ], picc line [  ]        Allergies    No Known Allergies        Vitals    T(F): 97.9 (01-02-19 @ 05:09), Max: 98.3 (01-01-19 @ 14:39)  HR: 91 (01-02-19 @ 11:25) (89 - 91)  BP: 105/63 (01-02-19 @ 11:25) (104/66 - 114/67)  RR: 18 (01-02-19 @ 11:25) (17 - 18)  SpO2: 100% (01-02-19 @ 11:25) (99% - 100%)  Wt(kg): --  CAPILLARY BLOOD GLUCOSE          Labs                          11.4   7.3   )-----------( 208      ( 02 Jan 2019 06:44 )             35.8       01-02    138  |  103  |  12  ----------------------------<  97  3.7   |  25  |  0.50    Ca    8.9      02 Jan 2019 06:44                  Radiology Results      Meds    MEDICATIONS  (STANDING):  heparin  Injectable 5000 Unit(s) SubCutaneous every 12 hours  influenza   Vaccine 0.5 milliLiter(s) IntraMuscular once  melatonin 3 milliGRAM(s) Oral at bedtime      MEDICATIONS  (PRN):  acetaminophen   Tablet .. 650 milliGRAM(s) Oral every 6 hours PRN Moderate Pain (4 - 6)  aluminum hydroxide/magnesium hydroxide/simethicone Suspension 30 milliLiter(s) Oral every 6 hours PRN Dyspepsia  LORazepam     Tablet 1 milliGRAM(s) Oral daily PRN Anxiety  ondansetron Injectable 4 milliGRAM(s) IV Push every 4 hours PRN Nausea and/or Vomiting      Physical Exam    Neuro :  no focal deficits  Respiratory: CTA B/L  CV: RRR, S1S2, no murmurs,   Abdominal: Soft, NT, ND +BS,  Extremities: No edema, + peripheral pulses    ASSESSMENT    Intra-abdominal and pelvic swelling of mass or lump  Meniscus tear  Anxiety  Asthma  Leukemia  Port catheter in place  S/P breast biopsy, right  S/P abdominoplasty  S/P myomectomy      PLAN Patient is a 48y old  Female who presents with a chief complaint of abdominal pain (02 Jan 2019 10:26)    pt seen in icu [  ], reg med floor [  x ], bed [ x ], chair at bedside [   ], a+o x3 [ x ], lethargic [  ],  nad [x  ]      Allergies    No Known Allergies        Vitals    T(F): 97.9 (01-02-19 @ 05:09), Max: 98.3 (01-01-19 @ 14:39)  HR: 91 (01-02-19 @ 11:25) (89 - 91)  BP: 105/63 (01-02-19 @ 11:25) (104/66 - 114/67)  RR: 18 (01-02-19 @ 11:25) (17 - 18)  SpO2: 100% (01-02-19 @ 11:25) (99% - 100%)  Wt(kg): --  CAPILLARY BLOOD GLUCOSE          Labs                          11.4   7.3   )-----------( 208      ( 02 Jan 2019 06:44 )             35.8       01-02    138  |  103  |  12  ----------------------------<  97  3.7   |  25  |  0.50    Ca    8.9      02 Jan 2019 06:44                  Radiology Results      Meds    MEDICATIONS  (STANDING):  heparin  Injectable 5000 Unit(s) SubCutaneous every 12 hours  influenza   Vaccine 0.5 milliLiter(s) IntraMuscular once  melatonin 3 milliGRAM(s) Oral at bedtime      MEDICATIONS  (PRN):  acetaminophen   Tablet .. 650 milliGRAM(s) Oral every 6 hours PRN Moderate Pain (4 - 6)  aluminum hydroxide/magnesium hydroxide/simethicone Suspension 30 milliLiter(s) Oral every 6 hours PRN Dyspepsia  LORazepam     Tablet 1 milliGRAM(s) Oral daily PRN Anxiety  ondansetron Injectable 4 milliGRAM(s) IV Push every 4 hours PRN Nausea and/or Vomiting      Physical Exam      Neuro :  no focal deficits  Respiratory: CTA B/L  CV: RRR, S1S2, no murmurs,   Abdominal: Soft, NT, ND +BS,  Extremities: No edema, + peripheral pulses    ASSESSMENT    low abdominal soft tissue mass with   numerous retroperitoneal lymph nodes   r/o malignancy  h/o Meniscus tear  Anxiety  Asthma  Leukemia  S/P breast biopsy, right  S/P abdominoplasty  S/P myomectomy      PLAN      ct abd-pelv results noted   cea, ca125, ca 15-3 elevated  ca 19-9 wnl  heme-onc f/u  surg cons  ir for bx today  f/u patho  gi f/u  Colonoscopy to follow after biopsy  pulm cons noted  cont current meds

## 2019-01-02 NOTE — PROGRESS NOTE ADULT - ASSESSMENT
48F, from home, lives alone, w/ PMHx anxiety disorder, 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 Patient mentions pain went away for a month and came back after having sexual intercourse. Patient attributes symptoms to sexual activity, and mentions she's symptom free otherwise. Has visited [facility] ER multiple times for the past 3 months, for the same complaint and has was told she had abdominal spasms. Mentions being unable to move out of bed due to symptoms. Denies night sweats, chills, fevers, weight loss, fatigue, diarrhea or any other complaint. Refers taking Tylenol and Aleve at home with minimal improvement of symptoms. Patient completed 3-4 yrs of chemo, and afterwards has not followed with heme/onc; mentions only following up with PCP. Patient reports a history of umbilical hernia which was repaired several years ago. Patient denies having any colonoscopy or mammography. Reports having a Pap smear 9 mos ago which was normal, and breast examination as well.     *Patient seems to have some degree of cognitive impairment. Primary team to contact family for collateral information*

## 2019-01-03 ENCOUNTER — TRANSCRIPTION ENCOUNTER (OUTPATIENT)
Age: 49
End: 2019-01-03

## 2019-01-03 VITALS
RESPIRATION RATE: 17 BRPM | OXYGEN SATURATION: 100 % | SYSTOLIC BLOOD PRESSURE: 96 MMHG | HEART RATE: 104 BPM | TEMPERATURE: 98 F | DIASTOLIC BLOOD PRESSURE: 50 MMHG

## 2019-01-03 LAB
ANION GAP SERPL CALC-SCNC: 8 MMOL/L — SIGNIFICANT CHANGE UP (ref 5–17)
APTT BLD: 27 SEC — LOW (ref 27.5–36.3)
BUN SERPL-MCNC: 11 MG/DL — SIGNIFICANT CHANGE UP (ref 7–18)
CALCIUM SERPL-MCNC: 9.1 MG/DL — SIGNIFICANT CHANGE UP (ref 8.4–10.5)
CHLORIDE SERPL-SCNC: 103 MMOL/L — SIGNIFICANT CHANGE UP (ref 96–108)
CO2 SERPL-SCNC: 28 MMOL/L — SIGNIFICANT CHANGE UP (ref 22–31)
CREAT SERPL-MCNC: 0.54 MG/DL — SIGNIFICANT CHANGE UP (ref 0.5–1.3)
GLUCOSE SERPL-MCNC: 100 MG/DL — HIGH (ref 70–99)
HCT VFR BLD CALC: 35.9 % — SIGNIFICANT CHANGE UP (ref 34.5–45)
HGB BLD-MCNC: 11.6 G/DL — SIGNIFICANT CHANGE UP (ref 11.5–15.5)
INR BLD: 1.24 RATIO — HIGH (ref 0.88–1.16)
MCHC RBC-ENTMCNC: 27.1 PG — SIGNIFICANT CHANGE UP (ref 27–34)
MCHC RBC-ENTMCNC: 32.2 GM/DL — SIGNIFICANT CHANGE UP (ref 32–36)
MCV RBC AUTO: 84.2 FL — SIGNIFICANT CHANGE UP (ref 80–100)
PLATELET # BLD AUTO: 220 K/UL — SIGNIFICANT CHANGE UP (ref 150–400)
POTASSIUM SERPL-MCNC: 4 MMOL/L — SIGNIFICANT CHANGE UP (ref 3.5–5.3)
POTASSIUM SERPL-SCNC: 4 MMOL/L — SIGNIFICANT CHANGE UP (ref 3.5–5.3)
PROTHROM AB SERPL-ACNC: 13.9 SEC — HIGH (ref 10–12.9)
RBC # BLD: 4.26 M/UL — SIGNIFICANT CHANGE UP (ref 3.8–5.2)
RBC # FLD: 12.7 % — SIGNIFICANT CHANGE UP (ref 10.3–14.5)
SODIUM SERPL-SCNC: 139 MMOL/L — SIGNIFICANT CHANGE UP (ref 135–145)
WBC # BLD: 6.8 K/UL — SIGNIFICANT CHANGE UP (ref 3.8–10.5)
WBC # FLD AUTO: 6.8 K/UL — SIGNIFICANT CHANGE UP (ref 3.8–10.5)

## 2019-01-03 PROCEDURE — 82746 ASSAY OF FOLIC ACID SERUM: CPT

## 2019-01-03 PROCEDURE — 84702 CHORIONIC GONADOTROPIN TEST: CPT

## 2019-01-03 PROCEDURE — 86301 IMMUNOASSAY TUMOR CA 19-9: CPT

## 2019-01-03 PROCEDURE — 80048 BASIC METABOLIC PNL TOTAL CA: CPT

## 2019-01-03 PROCEDURE — 36415 COLL VENOUS BLD VENIPUNCTURE: CPT

## 2019-01-03 PROCEDURE — 86900 BLOOD TYPING SEROLOGIC ABO: CPT

## 2019-01-03 PROCEDURE — 81025 URINE PREGNANCY TEST: CPT

## 2019-01-03 PROCEDURE — 99285 EMERGENCY DEPT VISIT HI MDM: CPT | Mod: 25

## 2019-01-03 PROCEDURE — 74177 CT ABD & PELVIS W/CONTRAST: CPT

## 2019-01-03 PROCEDURE — 82378 CARCINOEMBRYONIC ANTIGEN: CPT

## 2019-01-03 PROCEDURE — 80061 LIPID PANEL: CPT

## 2019-01-03 PROCEDURE — 71045 X-RAY EXAM CHEST 1 VIEW: CPT

## 2019-01-03 PROCEDURE — 86300 IMMUNOASSAY TUMOR CA 15-3: CPT

## 2019-01-03 PROCEDURE — 85730 THROMBOPLASTIN TIME PARTIAL: CPT

## 2019-01-03 PROCEDURE — 85027 COMPLETE CBC AUTOMATED: CPT

## 2019-01-03 PROCEDURE — 83735 ASSAY OF MAGNESIUM: CPT

## 2019-01-03 PROCEDURE — 84100 ASSAY OF PHOSPHORUS: CPT

## 2019-01-03 PROCEDURE — 84443 ASSAY THYROID STIM HORMONE: CPT

## 2019-01-03 PROCEDURE — 81001 URINALYSIS AUTO W/SCOPE: CPT

## 2019-01-03 PROCEDURE — 83036 HEMOGLOBIN GLYCOSYLATED A1C: CPT

## 2019-01-03 PROCEDURE — 82150 ASSAY OF AMYLASE: CPT

## 2019-01-03 PROCEDURE — 83690 ASSAY OF LIPASE: CPT

## 2019-01-03 PROCEDURE — 93005 ELECTROCARDIOGRAM TRACING: CPT

## 2019-01-03 PROCEDURE — 80053 COMPREHEN METABOLIC PANEL: CPT

## 2019-01-03 PROCEDURE — 86304 IMMUNOASSAY TUMOR CA 125: CPT

## 2019-01-03 PROCEDURE — 82607 VITAMIN B-12: CPT

## 2019-01-03 PROCEDURE — 85610 PROTHROMBIN TIME: CPT

## 2019-01-03 PROCEDURE — 82306 VITAMIN D 25 HYDROXY: CPT

## 2019-01-03 PROCEDURE — 86901 BLOOD TYPING SEROLOGIC RH(D): CPT

## 2019-01-03 PROCEDURE — 86850 RBC ANTIBODY SCREEN: CPT

## 2019-01-03 RX ORDER — ONDANSETRON 8 MG/1
4 TABLET, FILM COATED ORAL ONCE
Qty: 0 | Refills: 0 | Status: DISCONTINUED | OUTPATIENT
Start: 2019-01-03 | End: 2019-01-03

## 2019-01-03 RX ORDER — ONDANSETRON 8 MG/1
4 TABLET, FILM COATED ORAL ONCE
Qty: 0 | Refills: 0 | Status: COMPLETED | OUTPATIENT
Start: 2019-01-03 | End: 2019-01-03

## 2019-01-03 RX ORDER — LANOLIN ALCOHOL/MO/W.PET/CERES
1 CREAM (GRAM) TOPICAL
Qty: 14 | Refills: 0 | OUTPATIENT
Start: 2019-01-03 | End: 2019-01-16

## 2019-01-03 RX ORDER — ACETAMINOPHEN 500 MG
2 TABLET ORAL
Qty: 56 | Refills: 0 | OUTPATIENT
Start: 2019-01-03 | End: 2019-01-09

## 2019-01-03 RX ADMIN — Medication 1 MILLIGRAM(S): at 17:07

## 2019-01-03 RX ADMIN — ONDANSETRON 4 MILLIGRAM(S): 8 TABLET, FILM COATED ORAL at 10:01

## 2019-01-03 RX ADMIN — Medication 650 MILLIGRAM(S): at 13:18

## 2019-01-03 RX ADMIN — Medication 650 MILLIGRAM(S): at 12:13

## 2019-01-03 NOTE — PROGRESS NOTE ADULT - SUBJECTIVE AND OBJECTIVE BOX
Patient is a 48y old  Female who presents with a chief complaint of abdominal pain (03 Jan 2019 11:11)    pt seen in icu [  ], reg med floor [  x ], bed [ x ], chair at bedside [   ], a+o x3 [ x ], lethargic [  ],  nad [x  ]      Allergies    No Known Allergies        Vitals    T(F): 97.9 (01-03-19 @ 04:55), Max: 98.7 (01-02-19 @ 13:32)  HR: 97 (01-03-19 @ 04:55) (97 - 111)  BP: 115/59 (01-03-19 @ 04:55) (109/59 - 115/59)  RR: 18 (01-03-19 @ 04:55) (17 - 18)  SpO2: 97% (01-03-19 @ 04:55) (97% - 98%)  Wt(kg): --  CAPILLARY BLOOD GLUCOSE          Labs                          11.6   6.8   )-----------( 220      ( 03 Jan 2019 08:04 )             35.9       01-03    139  |  103  |  11  ----------------------------<  100<H>  4.0   |  28  |  0.54    Ca    9.1      03 Jan 2019 08:04                  Radiology Results      Meds    MEDICATIONS  (STANDING):  heparin  Injectable 5000 Unit(s) SubCutaneous every 12 hours  influenza   Vaccine 0.5 milliLiter(s) IntraMuscular once  melatonin 3 milliGRAM(s) Oral at bedtime      MEDICATIONS  (PRN):  acetaminophen   Tablet .. 650 milliGRAM(s) Oral every 6 hours PRN Moderate Pain (4 - 6)  aluminum hydroxide/magnesium hydroxide/simethicone Suspension 30 milliLiter(s) Oral every 6 hours PRN Dyspepsia  LORazepam     Tablet 1 milliGRAM(s) Oral daily PRN Anxiety  ondansetron Injectable 4 milliGRAM(s) IV Push every 4 hours PRN Nausea and/or Vomiting      Physical Exam      Neuro :  no focal deficits  Respiratory: CTA B/L  CV: RRR, S1S2, no murmurs,   Abdominal: Soft, NT, ND +BS,  Extremities: No edema, + peripheral pulses    ASSESSMENT    low abdominal soft tissue mass with   numerous retroperitoneal lymph nodes   r/o malignancy  h/o Meniscus tear  Anxiety  Asthma  Leukemia  S/P breast biopsy, right  S/P abdominoplasty  S/P myomectomy      PLAN      ct abd-pelv results noted   cea, ca125, ca 15-3 elevated  ca 19-9 wnl  heme-onc f/u  ir for bx today  pt declined having bx under concious sedation  ir guillermoal noted  The biopsy might need to be performed together with the anesthesia team, so the patient can get deeper, more controlled level of sedation.   Patient was also given my contact information so that she can schedule the appointment with IR as outpatient, in case she gets discharged.  gi f/u  Colonoscopy to follow after biopsy  pulm f/u noted  cont current meds  pt stable for d/c  pt to make apr with ir to have bx done with anesthesia team

## 2019-01-03 NOTE — PROGRESS NOTE ADULT - PROBLEM SELECTOR PLAN 2
Bronchodilators prn  inhaled steroids   PFTs as OP.
C/w Ativan 1mg QD home dose
C/w Ativan 1mg QD home dose
Resume Ativan 1mg QD home dose

## 2019-01-03 NOTE — DISCHARGE NOTE ADULT - MEDICATION SUMMARY - MEDICATIONS TO TAKE
I will START or STAY ON the medications listed below when I get home from the hospital:    acetaminophen 325 mg oral tablet  -- 2 tab(s) by mouth every 6 hours, As needed, Moderate Pain (4 - 6)  -- Indication: For Analgesic    LORazepam 1 mg oral tablet  -- 1 tab(s) by mouth once a day, As needed, Anxiety MDD:1 tablet  -- Indication: For Anxiety    melatonin 3 mg oral tablet  -- 1 tab(s) by mouth once a day (at bedtime), As Needed -for insomnia  -- Indication: For Sleep

## 2019-01-03 NOTE — PROGRESS NOTE ADULT - SUBJECTIVE AND OBJECTIVE BOX
Patient is a 48y old  Female who presents with a chief complaint of abdominal pain (02 Jan 2019 13:20)    Awake ,alert ,lying in bed in NAD. No cough or sob .     INTERVAL HPI/OVERNIGHT EVENTS:      VITAL SIGNS:  T(F): 97.9 (01-03-19 @ 04:55)  HR: 97 (01-03-19 @ 04:55)  BP: 115/59 (01-03-19 @ 04:55)  RR: 18 (01-03-19 @ 04:55)  SpO2: 97% (01-03-19 @ 04:55)  Wt(kg): --  I&O's Detail          REVIEW OF SYSTEMS:    CONSTITUTIONAL:  No fevers, chills, sweats    HEENT:  Eyes:  No diplopia or blurred vision. ENT:  No earache, sore throat or runny nose.    CARDIOVASCULAR:  No pressure, squeezing, tightness, or heaviness about the chest; no palpitations.    RESPIRATORY:  Per HPI    GASTROINTESTINAL:  No abdominal pain, nausea, vomiting or diarrhea.    GENITOURINARY:  No dysuria, frequency or urgency.    NEUROLOGIC:  No paresthesias, fasciculations, seizures or weakness.    PSYCHIATRIC:  No disorder of thought or mood.      PHYSICAL EXAM:    Constitutional: Well developed and nourished  Eyes:Perrla  ENMT: normal  Neck:supple  Respiratory: good air entry  Cardiovascular: S1 S2 regular  Gastrointestinal: Soft, Non tender  Extremities: No edema  Vascular:normal  Neurological:Awake, alert,Ox3  Musculoskeletal:Normal      MEDICATIONS  (STANDING):  heparin  Injectable 5000 Unit(s) SubCutaneous every 12 hours  influenza   Vaccine 0.5 milliLiter(s) IntraMuscular once  melatonin 3 milliGRAM(s) Oral at bedtime    MEDICATIONS  (PRN):  acetaminophen   Tablet .. 650 milliGRAM(s) Oral every 6 hours PRN Moderate Pain (4 - 6)  aluminum hydroxide/magnesium hydroxide/simethicone Suspension 30 milliLiter(s) Oral every 6 hours PRN Dyspepsia  LORazepam     Tablet 1 milliGRAM(s) Oral daily PRN Anxiety  ondansetron Injectable 4 milliGRAM(s) IV Push every 4 hours PRN Nausea and/or Vomiting      Allergies    No Known Allergies    Intolerances        LABS:                        11.6   6.8   )-----------( 220      ( 03 Jan 2019 08:04 )             35.9     01-03    139  |  103  |  11  ----------------------------<  100<H>  4.0   |  28  |  0.54    Ca    9.1      03 Jan 2019 08:04      PT/INR - ( 03 Jan 2019 08:37 )   PT: 13.9 sec;   INR: 1.24 ratio         PTT - ( 03 Jan 2019 08:37 )  PTT:27.0 sec          CAPILLARY BLOOD GLUCOSE            RADIOLOGY & ADDITIONAL TESTS:    RADIOLOGY & ADDITIONAL STUDIES:    CXR:  < from: Xray Chest 1 View- PORTABLE-Routine (12.29.18 @ 02:00) >  Impression:    No acute pulmonary process demonstrated.    < end of copied text >    Ct scan chest:  < from: CT Abdomen and Pelvis w/ Oral Cont and w/ IV Cont (12.28.18 @ 21:44) >  FINDINGS:    LOWER CHEST: Within normal limits.    < end of copied text >    ekg;    echo: Patient is a 48y old  Female who presents with a chief complaint of abdominal pain (02 Jan 2019 13:20)    Awake, alert, lying in bed in NAD. No cough or sob. For abdominal mass biopsy today    INTERVAL HPI/OVERNIGHT EVENTS:      VITAL SIGNS:  T(F): 97.9 (01-03-19 @ 04:55)  HR: 97 (01-03-19 @ 04:55)  BP: 115/59 (01-03-19 @ 04:55)  RR: 18 (01-03-19 @ 04:55)  SpO2: 97% (01-03-19 @ 04:55)  Wt(kg): --  I&O's Detail          REVIEW OF SYSTEMS:    CONSTITUTIONAL:  No fevers, chills, sweats    HEENT:  Eyes:  No diplopia or blurred vision. ENT:  No earache, sore throat or runny nose.    CARDIOVASCULAR:  No pressure, squeezing, tightness, or heaviness about the chest; no palpitations.    RESPIRATORY:  Per HPI    GASTROINTESTINAL:  No abdominal pain, nausea, vomiting or diarrhea.    GENITOURINARY:  No dysuria, frequency or urgency.    NEUROLOGIC:  No paresthesias, fasciculations, seizures or weakness.    PSYCHIATRIC:  No disorder of thought or mood.      PHYSICAL EXAM:    Constitutional: Well developed and nourished  Eyes:Perrla  ENMT: normal  Neck:supple  Respiratory: good air entry  Cardiovascular: S1 S2 regular  Gastrointestinal: Soft, Non tender  Extremities: No edema  Vascular:normal  Neurological:Awake, alert,Ox3  Musculoskeletal:Normal      MEDICATIONS  (STANDING):  heparin  Injectable 5000 Unit(s) SubCutaneous every 12 hours  influenza   Vaccine 0.5 milliLiter(s) IntraMuscular once  melatonin 3 milliGRAM(s) Oral at bedtime    MEDICATIONS  (PRN):  acetaminophen   Tablet .. 650 milliGRAM(s) Oral every 6 hours PRN Moderate Pain (4 - 6)  aluminum hydroxide/magnesium hydroxide/simethicone Suspension 30 milliLiter(s) Oral every 6 hours PRN Dyspepsia  LORazepam     Tablet 1 milliGRAM(s) Oral daily PRN Anxiety  ondansetron Injectable 4 milliGRAM(s) IV Push every 4 hours PRN Nausea and/or Vomiting      Allergies    No Known Allergies    Intolerances        LABS:                        11.6   6.8   )-----------( 220      ( 03 Jan 2019 08:04 )             35.9     01-03    139  |  103  |  11  ----------------------------<  100<H>  4.0   |  28  |  0.54    Ca    9.1      03 Jan 2019 08:04      PT/INR - ( 03 Jan 2019 08:37 )   PT: 13.9 sec;   INR: 1.24 ratio         PTT - ( 03 Jan 2019 08:37 )  PTT:27.0 sec          CAPILLARY BLOOD GLUCOSE            RADIOLOGY & ADDITIONAL TESTS:    RADIOLOGY & ADDITIONAL STUDIES:    CXR:  < from: Xray Chest 1 View- PORTABLE-Routine (12.29.18 @ 02:00) >  Impression:    No acute pulmonary process demonstrated.    < end of copied text >    Ct scan chest:  < from: CT Abdomen and Pelvis w/ Oral Cont and w/ IV Cont (12.28.18 @ 21:44) >  FINDINGS:    LOWER CHEST: Within normal limits.    < end of copied text >    ekg;    echo:

## 2019-01-03 NOTE — DISCHARGE NOTE ADULT - PATIENT PORTAL LINK FT
You can access the [facility] Patient Portal, offered by [facility], by registering with the following website: [email or web address]

## 2019-01-03 NOTE — PROGRESS NOTE ADULT - PROBLEM SELECTOR PLAN 3
cont meds.
IMPROVE VTE Individual Risk Assessment          RISK                                                          Points  [  ] Previous VTE                                                3  [  ] Thrombophilia                                             2  [  ] Lower limb paralysis                                   2        (unable to hold up >15 seconds)    [  ] Current Cancer                                             2         (within 6 months)  [ x ] Immobilization > 24 hrs                              1  [  ] ICU/CCU stay > 24 hours                             1  [  ] Age > 60                                                         1    IMPROVE VTE Score: 2  HSQ for DVT chemoppx

## 2019-01-03 NOTE — PROGRESS NOTE ADULT - SUBJECTIVE AND OBJECTIVE BOX
Patient is a 48y old  Female with abdominal mass and high tumor markers  Events of last 24 hours noted  Pt was unable to get IR guided biopsy yesterday  Wants anesthesia  May be planned as out patient    No chest pain, no vomiting, no headaches          MEDICATIONS  (STANDING):  heparin  Injectable 5000 Unit(s) SubCutaneous every 12 hours  influenza   Vaccine 0.5 milliLiter(s) IntraMuscular once  melatonin 3 milliGRAM(s) Oral at bedtime    MEDICATIONS  (PRN):  acetaminophen   Tablet .. 650 milliGRAM(s) Oral every 6 hours PRN Moderate Pain (4 - 6)  aluminum hydroxide/magnesium hydroxide/simethicone Suspension 30 milliLiter(s) Oral every 6 hours PRN Dyspepsia  LORazepam     Tablet 1 milliGRAM(s) Oral daily PRN Anxiety  ondansetron Injectable 4 milliGRAM(s) IV Push every 4 hours PRN Nausea and/or Vomiting      Allergies    No Known Allergies    Intolerances        Vital Signs Last 24 Hrs  T(C): 36.9 (03 Jan 2019 13:19), Max: 37.1 (02 Jan 2019 21:22)  T(F): 98.4 (03 Jan 2019 13:19), Max: 98.7 (02 Jan 2019 21:22)  HR: 104 (03 Jan 2019 13:19) (97 - 104)  BP: 96/50 (03 Jan 2019 13:19) (96/50 - 115/59)  BP(mean): --  RR: 17 (03 Jan 2019 13:19) (17 - 18)  SpO2: 100% (03 Jan 2019 13:19) (97% - 100%)    PHYSICAL EXAM  General: adult in NAD  rest deferred      LABS:                          11.6   6.8   )-----------( 220      ( 03 Jan 2019 08:04 )             35.9         Mean Cell Volume : 84.2 fl  Mean Cell Hemoglobin : 27.1 pg  Mean Cell Hemoglobin Concentration : 32.2 gm/dL  Auto Neutrophil # : x  Auto Lymphocyte # : x  Auto Monocyte # : x  Auto Eosinophil # : x  Auto Basophil # : x  Auto Neutrophil % : x  Auto Lymphocyte % : x  Auto Monocyte % : x  Auto Eosinophil % : x  Auto Basophil % : x      Serial CBC's  01-03 @ 08:04  Hct-35.9 / Hgb-11.6 / Plat-220 / RBC-4.26 / WBC-6.8  Serial CBC's  01-02 @ 06:44  Hct-35.8 / Hgb-11.4 / Plat-208 / RBC-4.15 / WBC-7.3  Serial CBC's  01-01 @ 06:18  Hct-35.3 / Hgb-11.4 / Plat-207 / RBC-4.08 / WBC-7.6      01-03    139  |  103  |  11  ----------------------------<  100<H>  4.0   |  28  |  0.54    Ca    9.1      03 Jan 2019 08:04        PT/INR - ( 03 Jan 2019 08:37 )   PT: 13.9 sec;   INR: 1.24 ratio         PTT - ( 03 Jan 2019 08:37 )  PTT:27.0 sec    Folate, Serum: 12.4 ng/mL (12-29 @ 09:09)  Vitamin B12, Serum: >2000 pg/mL (12-29 @ 09:09)

## 2019-01-03 NOTE — DISCHARGE NOTE ADULT - CARE PLAN
Principal Discharge DX:	Abdominal mass  Goal:	Continue to pursue diagnostic studies starting with abdominal mass biopsy  Assessment and plan of treatment:	You presented with abdominal pain, nausea/vomiting for the past 3 months. CT showed 3 x 3.7 x 4.9 cm sized low abdominal soft tissue mass with numerous retroperitoneal lymph nodes, likely lymphoma or metastasis. Tumor markers including CEA, Ca 125, Ca 15-3 were elevated.   You were scheduled for biopsy of the mass on 1/3. However, you refused to undergo procedure with local anesthesia, wanted general anesthesia. You were re-assured that this procedure would be done with conscious sedation, but you continued to be extremely anxious and not willing to proceed. The biopsy might need to be performed together with the anesthesia team, so you can get deeper, more controlled level of sedation. This was discussed with attending physician Dr. Martin and oncologist Dr. Pandey. You were also given interventional radiologist (IR) Dr. Sorensen's information so that you could schedule the appointment for the biopsy with IR as outpatient.  The main reason of biopsy is to find the type of cancer and possibly the site of origin, as the treatment is different. Make sure that you schedule the biopsy as soon as possible, within 2 weeks of discharge.  Oncologist Jake will take over your case from Dr. Pandey starting from 1/3 should you choose to follow Dr. Joseph as an outpatient. You are provided with Dr. Joseph's contact information below in case you do not have an outpatient oncologist you see on a regular basis.    Damion Joseph MD  [facility] [address], [address] Floor, [address], [place] [id number]  Phone: [phone]  Secondary Diagnosis:	Anxiety  Assessment and plan of treatment:	You came in with history of anxiety. You were continued with ativan 1 mg tablet daily as needed for your anxiety. 6 days worth of Ativan has been prescribed to you and this information was sent to your pharmacy. Please follow up with your PCP within 1 week of discharge to continue receiving prescriptions for your regular medications.

## 2019-01-03 NOTE — PROGRESS NOTE ADULT - PROBLEM SELECTOR PLAN 1
and Ca 15.3 are very high in addition to CEA  no sure about the origin of the cancer  breast, GI, Gyn cancer?  will do CT guided biopsy first  discussed with her  she requests to have sedation
C/w supportive care  IR for bx  today  breast vs GI vs Gyn cancer  Further investigations to be pursued after biopsy  Plan for colonoscopy after IR biopsy to r/o GI source.  GI follow up   Hem/onc follow up.
CT A/P showing  low abdominal soft tissue mass with   numerous retroperitoneal lymph nodes. Leading differential is lymphoma or metastasis.  IR biopsy of mass likely Wed  Plan for colonoscopy after IR biopsy to r/o GI source
Patient p/w crampy, intermittent abdominal pain in addition to N/V x 3 months. CT A/P: < from: 3 x 3.7 x 4.9 cm sized low abdominal soft tissue mass with numerous retroperitoneal lymph nodes as described. Leading differential is lymphoma or metastasis. 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   -C/w supportive care  -IR for bx on Wednesday  -CEA, Ca 125, Ca 15-3 elevated but Ca 19-9 wnl  -Potentially breast vs GI vs Gyn cancer  -Further investigations to be pursued after biopsy  -Plan for colonoscopy after IR biopsy to r/o GI source

## 2019-01-03 NOTE — PROGRESS NOTE ADULT - PROVIDER SPECIALTY LIST ADULT
Gastroenterology
Heme/Onc
Internal Medicine
Pulmonology
Intervent Radiology
Internal Medicine

## 2019-01-03 NOTE — PROGRESS NOTE ADULT - SUBJECTIVE AND OBJECTIVE BOX
Patient presented to IR for abdominal mass biopsy. The procedure, its benefits, risks and alternatives were discussed with the patient and her brother in detail, and witnessed informed consent was obtained. Patient was placed on the procedure table and connected to physiologic monitoring, at which point she got up and said that she does not want to proceed with the procedure because she states "she wants to be completely out." Patient was re-assured that this procedure would be done with conscious sedation, but she continued to be extremely anxious and not willing to proceed. The biopsy might need to be performed together with the anesthesia team, so the patient can get deeper, more controlled level of sedation. This was discussed with Dr. Martin and Dr. Pandey. Patient was also given my contact information so that she can schedule the appointment with IR as outpatient, in case she gets discharged.

## 2019-01-03 NOTE — PROGRESS NOTE ADULT - ASSESSMENT
Abdominal mass  Pt was told that it is cancer until proven otherwise  The main reason of biopsy is to find the type of cancer and possibly the site of origin, as the treatment is different    Pt is in partial denial    Recommend out patient followup with oncology on discharge within 2 weeks if not earlier    Dr Joseph with follow from tomorrow onwards  Nasir Gondal  [phone]

## 2019-01-03 NOTE — DISCHARGE NOTE ADULT - HOSPITAL COURSE
Ms. 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 Patient mentions pain went away for a month and came back after having sexual intercourse. Patient attributes symptoms to sexual activity, and mentions she's symptom free otherwise. Has visited [facility] ER multiple times for the past 3 months, for the same complaint and has was told she had abdominal spasms. Mentions being unable to move out of bed due to symptoms. Denies night sweats, chills, fevers, weight loss, fatigue, diarrhea or any other complaint. Refers taking Tylenol and Aleve at home with minimal improvement of symptoms. Patient completed 3-4 yrs of chemo, and afterwards has not followed with heme/onc; mentions only following up with PCP. Patient reports a history of umbilical hernia which was repaired several years ago. Patient denies having any colonoscopy or mammography. Reports having a Pap smear 9 mos ago which was normal, and breast examination as well.     Patient p/w crampy, intermittent abdominal pain in addition to N/V x 3 months. CT showed 3 x 3.7 x 4.9 cm sized low abdominal soft tissue mass with numerous retroperitoneal lymph nodes. Leading differential is lymphoma or metastasis. 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 CEA, Ca 125, Ca 15-3 elevated but Ca 19-9 wnl.    Patient was provided supportive care. Patient was scheduled for biopsy on 1/3. However, refused to undergo procedure with local anesthesia, wanted general anesthesia. Patient was re-assured that this procedure would be done with conscious sedation, but she continued to be extremely anxious and not willing to proceed. The biopsy might need to be performed together with the anesthesia team, so the patient can get deeper, more controlled level of sedation. This was discussed with Dr. Martin and Dr. Pandey. Patient was also given IR Dr. Sorensen's information so that she could schedule the appointment with IR as outpatient.    The main reason of biopsy is to find the type of cancer and possibly the site of origin, as the treatment is different. Recommend out-patient follow-up with oncology on discharge within 2 weeks if not earlier.     Oncologist Jake will take over her case per Dr. Pandey starting from 1/3. Patient provided Dr. Joseph's contact information in case she does not have an outpatient oncologist she sees on a regular basis.    Damion Joseph MD  [facility] [place]  [address], [address] Floor, [address], [place] [address]  Phone: [phone]

## 2019-01-03 NOTE — DISCHARGE NOTE ADULT - PLAN OF CARE
Continue to pursue diagnostic studies starting with abdominal mass biopsy You presented with abdominal pain, nausea/vomiting for the past 3 months. CT showed 3 x 3.7 x 4.9 cm sized low abdominal soft tissue mass with numerous retroperitoneal lymph nodes, likely lymphoma or metastasis. Tumor markers including CEA, Ca 125, Ca 15-3 were elevated.   You were scheduled for biopsy of the mass on 1/3. However, you refused to undergo procedure with local anesthesia, wanted general anesthesia. You were re-assured that this procedure would be done with conscious sedation, but you continued to be extremely anxious and not willing to proceed. The biopsy might need to be performed together with the anesthesia team, so you can get deeper, more controlled level of sedation. This was discussed with attending physician Dr. Martin and oncologist Dr. Pandey. You were also given interventional radiologist (IR) Dr. Sorensen's information so that you could schedule the appointment for the biopsy with IR as outpatient.  The main reason of biopsy is to find the type of cancer and possibly the site of origin, as the treatment is different. Make sure that you schedule the biopsy as soon as possible, within 2 weeks of discharge.  Oncologist Jake will take over your case from Dr. Pandey starting from 1/3 should you choose to follow Dr. Joseph as an outpatient. You are provided with Dr. Joseph's contact information below in case you do not have an outpatient oncologist you see on a regular basis.    Damion Joseph MD  [facility] [place]  [address], [address] Floor, [address], [place] [address]  Phone: [phone] You came in with history of anxiety. You were continued with ativan 1 mg tablet daily as needed for your anxiety. 6 days worth of Ativan has been prescribed to you and this information was sent to your pharmacy. Please follow up with your PCP within 1 week of discharge to continue receiving prescriptions for your regular medications.

## 2019-01-07 ENCOUNTER — OUTPATIENT (OUTPATIENT)
Dept: OUTPATIENT SERVICES | Facility: HOSPITAL | Age: 49
LOS: 1 days | End: 2019-01-07
Payer: MEDICARE

## 2019-01-07 ENCOUNTER — RESULT REVIEW (OUTPATIENT)
Age: 49
End: 2019-01-07

## 2019-01-07 ENCOUNTER — APPOINTMENT (OUTPATIENT)
Dept: INTERVENTIONAL RADIOLOGY/VASCULAR | Facility: HOSPITAL | Age: 49
End: 2019-01-07
Payer: MEDICARE

## 2019-01-07 DIAGNOSIS — R19.01 RIGHT UPPER QUADRANT ABDOMINAL SWELLING, MASS AND LUMP: ICD-10-CM

## 2019-01-07 DIAGNOSIS — R19.00 INTRA-ABDOMINAL AND PELVIC SWELLING, MASS AND LUMP, UNSPECIFIED SITE: ICD-10-CM

## 2019-01-07 PROCEDURE — 77012 CT SCAN FOR NEEDLE BIOPSY: CPT

## 2019-01-07 PROCEDURE — 20206 BIOPSY MUSCLE PERQ NEEDLE: CPT

## 2019-01-07 PROCEDURE — 77012 CT SCAN FOR NEEDLE BIOPSY: CPT | Mod: 26

## 2019-01-07 NOTE — ASU DISCHARGE PLAN (ADULT/PEDIATRIC). - NURSING INSTRUCTIONS
watch for signs of infection fever, chills redness and  swelling if present please go to the nearest ER . please follow up with referring doctor x 1 week for results

## 2019-01-07 NOTE — ASU DISCHARGE PLAN (ADULT/PEDIATRIC). - MEDICATION SUMMARY - MEDICATIONS TO TAKE
I will START or STAY ON the medications listed below when I get home from the hospital:    LORazepam 1 mg oral tablet  -- 1 tab(s) by mouth once a day, As needed, Anxiety MDD:1 tablet  -- Indication: For as directed

## 2019-01-07 NOTE — ASU DISCHARGE PLAN (ADULT/PEDIATRIC). - NOTIFY
Increased Irritability or Sluggishness/Pain not relieved by Medications/Fever greater than 101/Inability to Tolerate Liquids or Foods/Bleeding that does not stop/Swelling that continues

## 2022-05-24 NOTE — ASU DISCHARGE PLAN (ADULT/PEDIATRIC). - MEDICATION SUMMARY - MEDICATIONS TO STOP TAKING
Bed: 36  Expected date:   Expected time:   Means of arrival:   Comments:  Bell- 55F chest pain x 2 days; 324 mg ASA. VSS   I will STOP taking the medications listed below when I get home from the hospital:  None

## 2024-01-01 NOTE — DISCHARGE NOTE ADULT - CLICK TO LAUNCH ORM
